# Patient Record
Sex: MALE | Race: WHITE | NOT HISPANIC OR LATINO | URBAN - METROPOLITAN AREA
[De-identification: names, ages, dates, MRNs, and addresses within clinical notes are randomized per-mention and may not be internally consistent; named-entity substitution may affect disease eponyms.]

---

## 2017-04-11 ENCOUNTER — OUTPATIENT (OUTPATIENT)
Dept: OUTPATIENT SERVICES | Facility: HOSPITAL | Age: 67
LOS: 1 days | End: 2017-04-11
Payer: COMMERCIAL

## 2017-04-11 DIAGNOSIS — I77.810 THORACIC AORTIC ECTASIA: ICD-10-CM

## 2017-04-11 DIAGNOSIS — I10 ESSENTIAL (PRIMARY) HYPERTENSION: ICD-10-CM

## 2017-04-11 PROCEDURE — 93306 TTE W/DOPPLER COMPLETE: CPT

## 2017-04-11 PROCEDURE — 93306 TTE W/DOPPLER COMPLETE: CPT | Mod: 26

## 2017-04-20 ENCOUNTER — EMERGENCY (EMERGENCY)
Facility: HOSPITAL | Age: 67
LOS: 1 days | Discharge: HOME | End: 2017-04-20
Attending: EMERGENCY MEDICINE | Admitting: EMERGENCY MEDICINE
Payer: COMMERCIAL

## 2017-04-20 VITALS
SYSTOLIC BLOOD PRESSURE: 179 MMHG | HEART RATE: 91 BPM | OXYGEN SATURATION: 94 % | DIASTOLIC BLOOD PRESSURE: 88 MMHG | RESPIRATION RATE: 18 BRPM | TEMPERATURE: 98 F

## 2017-04-20 VITALS
TEMPERATURE: 98 F | SYSTOLIC BLOOD PRESSURE: 161 MMHG | OXYGEN SATURATION: 95 % | DIASTOLIC BLOOD PRESSURE: 97 MMHG | HEART RATE: 75 BPM | RESPIRATION RATE: 16 BRPM

## 2017-04-20 DIAGNOSIS — R04.0 EPISTAXIS: ICD-10-CM

## 2017-04-20 DIAGNOSIS — Z79.2 LONG TERM (CURRENT) USE OF ANTIBIOTICS: ICD-10-CM

## 2017-04-20 DIAGNOSIS — Z79.899 OTHER LONG TERM (CURRENT) DRUG THERAPY: ICD-10-CM

## 2017-04-20 DIAGNOSIS — I10 ESSENTIAL (PRIMARY) HYPERTENSION: ICD-10-CM

## 2017-04-20 DIAGNOSIS — Z88.1 ALLERGY STATUS TO OTHER ANTIBIOTIC AGENTS STATUS: ICD-10-CM

## 2017-04-20 PROCEDURE — 99282 EMERGENCY DEPT VISIT SF MDM: CPT

## 2017-04-20 RX ORDER — OXYMETAZOLINE HYDROCHLORIDE 0.5 MG/ML
1 SPRAY NASAL ONCE
Qty: 0 | Refills: 0 | Status: COMPLETED | OUTPATIENT
Start: 2017-04-20 | End: 2017-04-20

## 2017-04-20 RX ADMIN — OXYMETAZOLINE HYDROCHLORIDE 1 SPRAY(S): 0.5 SPRAY NASAL at 23:25

## 2017-04-20 NOTE — ED ADULT NURSE NOTE - OBJECTIVE STATEMENT
Pt to the ER c/o spontaneous nose bleed today.  Pt states he has been seen by his PCP for the same and cauterized x 2.  Pt denies injury or trauma.  Pt denies use of blood thinners. Pt denies fevers, sick contacts, or travel out of the country in the last 3 weeks. Upon arrival to the ER, pt is AAO X 4, fully verbal and mobile, gait steady.  Torso and extremities are warm and dry, skin color natural, and turgor normal.  Mucus membranes moist and pink.  HEENT intact. +PERRLA.  Trachea midline. No JVD. Lungs sound  CTA X 4. Resps slow and even, non labored.  S1, S2. No crepitus or signs of trauma. Abdomen soft and round, non distended, and non tender to palpation with normal active BS noted X 4.  Call bell in reach and patient's bed locked and placed in lowest position.  Pt provided with emotional support, comfort, frequent rounding, and POC reviewed.  All verbalized understanding and deny additional needs at this time. Will continue to monitor.

## 2017-04-20 NOTE — ED ADULT TRIAGE NOTE - CHIEF COMPLAINT QUOTE
I have a nosebleed that started today at 5:15 this morning (moderate) and stopped... again at 2 or 3 this afternoon, and again today it started bleeding heavily.  I have seen Dr. Ramos in his office for this before.

## 2017-04-20 NOTE — ED PROVIDER NOTE - OBJECTIVE STATEMENT
epistaxis recurrent last week or so with ENT cautery x several  and to ED as bleeding again today Denies trauma

## 2017-04-20 NOTE — ED ADULT NURSE NOTE - CHPI ED SYMPTOMS NEG
no fever/no syncope/no nausea/no blurred vision/no chills/no change in level of consciousness/no loss of consciousness/no numbness/no weakness/no vomiting

## 2017-09-10 ENCOUNTER — EMERGENCY (EMERGENCY)
Facility: HOSPITAL | Age: 67
LOS: 1 days | Discharge: HOME | End: 2017-09-10
Attending: EMERGENCY MEDICINE | Admitting: EMERGENCY MEDICINE
Payer: COMMERCIAL

## 2017-09-10 VITALS
RESPIRATION RATE: 17 BRPM | WEIGHT: 244.93 LBS | OXYGEN SATURATION: 97 % | HEART RATE: 80 BPM | HEIGHT: 73 IN | DIASTOLIC BLOOD PRESSURE: 81 MMHG | TEMPERATURE: 97 F | SYSTOLIC BLOOD PRESSURE: 145 MMHG

## 2017-09-10 PROCEDURE — 73630 X-RAY EXAM OF FOOT: CPT | Mod: 26

## 2017-09-10 PROCEDURE — 99283 EMERGENCY DEPT VISIT LOW MDM: CPT | Mod: 25

## 2017-09-10 PROCEDURE — 73630 X-RAY EXAM OF FOOT: CPT | Mod: 26,RT

## 2017-09-10 PROCEDURE — 73630 X-RAY EXAM OF FOOT: CPT

## 2017-09-10 RX ORDER — INDOMETHACIN 50 MG
1 CAPSULE ORAL
Qty: 15 | Refills: 0 | OUTPATIENT
Start: 2017-09-10 | End: 2017-09-15

## 2017-09-10 NOTE — ED PROVIDER NOTE - OBJECTIVE STATEMENT
Pt with PMHx HTN, pre-DM p/w R foot pain x 1 week. The pain is located in the location of the 1st MTP. Pt reports he thinks he stepped funny 1 week ago, but did not fall or twist the foot. No blunt trauma. No f/c. + alcohol abuse. No known hx gout. Pt report it was more swollen, but has since improved. Pt able to weight bear.

## 2017-09-10 NOTE — ED PROVIDER NOTE - MEDICAL DECISION MAKING DETAILS
1st MTP pain, swelling, erythema (mild). Gout vs pseudogout. Doubt injury given lack of YANNICK. Xr neg for acute pathology. Presumptive tx of gout. Indomethacin, f/u PCP

## 2017-09-10 NOTE — ED PROVIDER NOTE - DIAGNOSTIC INTERPRETATION
ER Physician: DO MILTON Montano foot INTERPRETATION:  no acute fracture; no soft tissue swelling noted; normal bony alignment.

## 2017-09-11 ENCOUNTER — TRANSCRIPTION ENCOUNTER (OUTPATIENT)
Age: 67
End: 2017-09-11

## 2017-09-14 DIAGNOSIS — M10.9 GOUT, UNSPECIFIED: ICD-10-CM

## 2017-09-14 DIAGNOSIS — Z79.899 OTHER LONG TERM (CURRENT) DRUG THERAPY: ICD-10-CM

## 2017-09-14 DIAGNOSIS — M79.671 PAIN IN RIGHT FOOT: ICD-10-CM

## 2017-09-14 DIAGNOSIS — I10 ESSENTIAL (PRIMARY) HYPERTENSION: ICD-10-CM

## 2017-09-14 DIAGNOSIS — Z88.1 ALLERGY STATUS TO OTHER ANTIBIOTIC AGENTS STATUS: ICD-10-CM

## 2017-11-18 ENCOUNTER — EMERGENCY (EMERGENCY)
Facility: HOSPITAL | Age: 67
LOS: 1 days | Discharge: HOME | End: 2017-11-18
Admitting: EMERGENCY MEDICINE
Payer: COMMERCIAL

## 2017-11-18 VITALS
DIASTOLIC BLOOD PRESSURE: 90 MMHG | OXYGEN SATURATION: 96 % | RESPIRATION RATE: 18 BRPM | SYSTOLIC BLOOD PRESSURE: 148 MMHG | HEIGHT: 73 IN | TEMPERATURE: 99 F | HEART RATE: 96 BPM | WEIGHT: 244.93 LBS

## 2017-11-18 DIAGNOSIS — Z76.0 ENCOUNTER FOR ISSUE OF REPEAT PRESCRIPTION: ICD-10-CM

## 2017-11-18 DIAGNOSIS — Z88.1 ALLERGY STATUS TO OTHER ANTIBIOTIC AGENTS STATUS: ICD-10-CM

## 2017-11-18 DIAGNOSIS — Z79.899 OTHER LONG TERM (CURRENT) DRUG THERAPY: ICD-10-CM

## 2017-11-18 DIAGNOSIS — I10 ESSENTIAL (PRIMARY) HYPERTENSION: ICD-10-CM

## 2017-11-18 PROCEDURE — 99283 EMERGENCY DEPT VISIT LOW MDM: CPT

## 2017-11-18 RX ORDER — ATENOLOL 25 MG/1
1 TABLET ORAL
Qty: 30 | Refills: 0 | OUTPATIENT
Start: 2017-11-18 | End: 2017-12-18

## 2017-11-18 RX ORDER — LOSARTAN POTASSIUM 100 MG/1
1 TABLET, FILM COATED ORAL
Qty: 30 | Refills: 0 | OUTPATIENT
Start: 2017-11-18 | End: 2017-12-18

## 2017-11-18 NOTE — ED ADULT TRIAGE NOTE - CHIEF COMPLAINT QUOTE
Pt requesting Med Refill for BP medications and CO nose bleeds.  No active bleeding in Triage.  No old blood noted to face during triage.  Pt states "I ran out of my blood pressure meds and I had a nosebleed this morning so I think it's related."  PT denies N/V/D, SOB, Fevers, Dizziness

## 2017-11-18 NOTE — ED PROVIDER NOTE - OBJECTIVE STATEMENT
66 yo M with pmh of HTN, GERD, arthritis requesting BP medication refill. Pt states he has intermittent nosebleeds for the past few months. Pt has seen ENT and other specialist but cannot figure out why he is having nosebleeds. Last nosebleed was this morning, lasted for a few min and resolved. Pt states years ago he was cauterized. Pt states he has been trying to conserve his atenolol because he could not get an appointment with his pmd so he has not been taking it every day. Pt ran out yesterday. Pt think when his BP the nosebleeds are more frequent. Denies HA, dizziness, cp, sob.

## 2017-11-18 NOTE — ED PROVIDER NOTE - PHYSICAL EXAMINATION
CONSTITUTIONAL: Well-appearing; well-nourished; in no apparent distress.   HEAD: Normocephalic; atraumatic.   EYES: PERRL; EOM intact; conjunctiva and sclera clear  ENT: normal nose; no rhinorrhea; normal pharynx with no erythema or lesions. +dried blood in both nares  NECK: Supple; non-tender;   CARDIOVASCULAR: Normal S1, S2; no murmurs, rubs, or gallops. Regular rate and rhythm.   RESPIRATORY: Breathing easily; breath sounds clear and equal bilaterally; no wheezes, rhonchi, or rales.  MSK: FROM at all extremities, normal tone   EXT: No cyanosis or edema; N/V intact  SKIN: Normal for age and race; warm; dry; good turgor; no apparent lesions or rash.

## 2017-11-18 NOTE — ED PROVIDER NOTE - NS_EDPROVIDERDISPOUSERTYPE_ED_A_ED
I have personally evaluated and examined the patient. The Attending was available to me as a supervising provider if needed./Attending Attestation (For Attendings USE Only)...

## 2017-11-18 NOTE — ED PROVIDER NOTE - MEDICAL DECISION MAKING DETAILS
66 yo M with pmh of HTN, GERD, arthritis requesting BP medication refill. No active nosebleeding now. No cp, sob, ha, dizziness. Medications refilled. Pt will make an appointment with his primary doctor.

## 2018-01-14 ENCOUNTER — EMERGENCY (EMERGENCY)
Facility: HOSPITAL | Age: 68
LOS: 1 days | Discharge: HOME | End: 2018-01-14
Admitting: EMERGENCY MEDICINE
Payer: MEDICARE

## 2018-01-14 VITALS
DIASTOLIC BLOOD PRESSURE: 88 MMHG | OXYGEN SATURATION: 95 % | HEART RATE: 89 BPM | WEIGHT: 154.98 LBS | TEMPERATURE: 98 F | RESPIRATION RATE: 18 BRPM | SYSTOLIC BLOOD PRESSURE: 149 MMHG

## 2018-01-14 DIAGNOSIS — B02.9 ZOSTER WITHOUT COMPLICATIONS: ICD-10-CM

## 2018-01-14 DIAGNOSIS — Z79.899 OTHER LONG TERM (CURRENT) DRUG THERAPY: ICD-10-CM

## 2018-01-14 DIAGNOSIS — R59.0 LOCALIZED ENLARGED LYMPH NODES: ICD-10-CM

## 2018-01-14 DIAGNOSIS — Z88.0 ALLERGY STATUS TO PENICILLIN: ICD-10-CM

## 2018-01-14 PROCEDURE — 99283 EMERGENCY DEPT VISIT LOW MDM: CPT | Mod: 25

## 2018-01-14 PROCEDURE — 99283 EMERGENCY DEPT VISIT LOW MDM: CPT

## 2018-01-14 RX ORDER — FAMCICLOVIR 500 MG/1
1 TABLET, FILM COATED ORAL
Qty: 21 | Refills: 0 | OUTPATIENT
Start: 2018-01-14 | End: 2018-01-20

## 2018-01-14 NOTE — ED ADULT NURSE NOTE - OBJECTIVE STATEMENT
pt. presented with c/o rash, swelling and discomfort to Lt lower side of face and neck. pt. denies any difficulty breathing or swallowing, fever, chills, n/v/d, pain, weakness.

## 2018-01-14 NOTE — ED PROVIDER NOTE - SKIN, MLM
+ vesicles on an erythematous base to L C3 dermatone - to L scalp - above the ear and another patch behind ear, no pus, no bleeding, no crossing of midline

## 2018-01-14 NOTE — ED PROVIDER NOTE - ENMT, MLM
Airway patent, Nasal mucosa clear. Mouth with normal mucosa. Throat has no vesicles, no oropharyngeal exudates and uvula is midline.  Ears: no vesicles to TMs b/l, TMs pearly gray b/l. + L cervical lymphadenopathy

## 2018-01-14 NOTE — ED ADULT NURSE NOTE - CHPI ED SYMPTOMS NEG
no decreased eating/drinking/no bruising/no chills/no pain/no petechia/no scaly patches on skin/no vomiting/no fever/no itching

## 2018-01-14 NOTE — ED PROVIDER NOTE - OBJECTIVE STATEMENT
The pt is a 66 y/o M, who presents to ED c/o "scratch" to scalp and swollen glands to L neck x 3-4 days. States that thinks his head phones scratched his scalp. + ache to site. has been applying bacitracin w/o relief. Had chicken pox as child. Denies fevers, chills, h/a, pus, bleeding, earache, hearing loss, eye pain, visual changes

## 2018-01-14 NOTE — ED ADULT TRIAGE NOTE - CHIEF COMPLAINT QUOTE
pt states "I feel like the glands of the left side of my neck are swollen" "I feel like I have an abrasion on my head from my ear muffs". reports anxiety "I got a little bit of bad news tonight"

## 2018-01-14 NOTE — ED PROVIDER NOTE - MEDICAL DECISION MAKING DETAILS
pt w/zoster to L C3 dermatone w/some reactive lymphadenopathy, no vesicles to TM nor nose, no eye involvement, no super imposed inf, symptoms present for 3-4 days but will tx w/antivirals regardless, pt understands to f/u w/ent or pmd, will return for any worsening symptoms

## 2018-02-21 ENCOUNTER — EMERGENCY (EMERGENCY)
Facility: HOSPITAL | Age: 68
LOS: 1 days | Discharge: HOME | End: 2018-02-21
Admitting: EMERGENCY MEDICINE
Payer: MEDICARE

## 2018-02-21 VITALS
HEIGHT: 73 IN | HEART RATE: 69 BPM | OXYGEN SATURATION: 96 % | TEMPERATURE: 98 F | WEIGHT: 244.93 LBS | SYSTOLIC BLOOD PRESSURE: 137 MMHG | RESPIRATION RATE: 16 BRPM | DIASTOLIC BLOOD PRESSURE: 84 MMHG
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
## 2018-02-21 DIAGNOSIS — Z79.891 LONG TERM (CURRENT) USE OF OPIATE ANALGESIC: ICD-10-CM

## 2018-02-21 DIAGNOSIS — Z76.0 ENCOUNTER FOR ISSUE OF REPEAT PRESCRIPTION: ICD-10-CM

## 2018-02-21 DIAGNOSIS — Z79.899 OTHER LONG TERM (CURRENT) DRUG THERAPY: ICD-10-CM

## 2018-02-21 DIAGNOSIS — S05.12XA CONTUSION OF EYEBALL AND ORBITAL TISSUES, LEFT EYE, INITIAL ENCOUNTER: ICD-10-CM
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
## 2018-02-21 DIAGNOSIS — Z79.2 LONG TERM (CURRENT) USE OF ANTIBIOTICS: ICD-10-CM
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
## 2018-02-21 DIAGNOSIS — Z88.1 ALLERGY STATUS TO OTHER ANTIBIOTIC AGENTS STATUS: ICD-10-CM
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
## 2018-02-21 DIAGNOSIS — H57.12 OCULAR PAIN, LEFT EYE: ICD-10-CM

## 2018-02-21 PROCEDURE — 99283 EMERGENCY DEPT VISIT LOW MDM: CPT

## 2018-02-21 RX ORDER — ATENOLOL 25 MG/1
1 TABLET ORAL
Qty: 14 | Refills: 0
Start: 2018-02-21 | End: 2018-03-06

## 2018-02-21 RX ORDER — LOSARTAN POTASSIUM 100 MG/1
1 TABLET, FILM COATED ORAL
Qty: 14 | Refills: 0
Start: 2018-02-21 | End: 2018-03-06

## 2018-02-21 NOTE — ED ADULT NURSE NOTE - OBJECTIVE STATEMENT
Pt presents to ED c/o eye injury PTA. Pt reports he was using a stethoscope, ear piece hit him in the L eye, now 3/10 pain. Visual acuity 20/20 L/R/bilateral. Pt also requesting refill of atenolol 25mg. Pt presents in NAD speaking full sentences ambulatory through triage.

## 2018-02-21 NOTE — ED PROVIDER NOTE - EYES, MLM
Clear bilaterally, pupils equal, round and reactive to light. VA: 20/30 b/l and 20/20 OU. normal fundoscopic exam b/l, no tearing b/l, no abrasions, no fb

## 2018-02-21 NOTE — ED PROVIDER NOTE - OBJECTIVE STATEMENT
The pt is a 66 y/o M, who presents to ED stating that he hit himself in the bridge of the nose while attempting to use a stethoscope (that came with a new bp set) -- hit at the bridge of nose / eyeball -not inside the eye with the plastic ear piece, now c/o soreness to area - 4/10, took ibuprofen, here stating "I want to file a complaint because it's the 's fault". Also, requesting refill for his atenolol and losartan (25 mg each). Denies bleeding, swelling, visual loss, h/a, n/v

## 2018-02-21 NOTE — ED PROVIDER NOTE - CARE PLAN
Principal Discharge DX:	Contusion of orbit, initial encounter  Secondary Diagnosis:	Medication refill

## 2018-02-21 NOTE — ED PROVIDER NOTE - MEDICAL DECISION MAKING DETAILS
pt claims to have hit himself w/plastic earpiece of stethoscope while attempting to use it at the inner L bridge of nose / eyeball - no objective findings of trauma, normal eye exam w/va intact, advised to ice and prn ibuprofen, f/u w/optho, pt also wanting refill for his bp meds - will give 2 wks worth until he can see pmd or clinic

## 2018-02-21 NOTE — ED PROVIDER NOTE - ENMT, MLM
Airway patent, Nasal mucosa clear, no nasal bridge swelling or tend. Mouth with normal mucosa. Throat has no vesicles, no oropharyngeal exudates and uvula is midline. no orbital tend b/l, no step offs, no ecchymosis, no breaks in skin

## 2018-03-14 ENCOUNTER — EMERGENCY (EMERGENCY)
Facility: HOSPITAL | Age: 68
LOS: 1 days | Discharge: HOME | End: 2018-03-14
Attending: EMERGENCY MEDICINE | Admitting: EMERGENCY MEDICINE
Payer: MEDICARE

## 2018-03-14 VITALS
SYSTOLIC BLOOD PRESSURE: 161 MMHG | RESPIRATION RATE: 18 BRPM | TEMPERATURE: 97 F | DIASTOLIC BLOOD PRESSURE: 97 MMHG | HEART RATE: 97 BPM | OXYGEN SATURATION: 97 % | WEIGHT: 250 LBS | HEIGHT: 73 IN

## 2018-03-14 DIAGNOSIS — Z88.0 ALLERGY STATUS TO PENICILLIN: ICD-10-CM

## 2018-03-14 DIAGNOSIS — Z79.899 OTHER LONG TERM (CURRENT) DRUG THERAPY: ICD-10-CM

## 2018-03-14 DIAGNOSIS — L03.115 CELLULITIS OF RIGHT LOWER LIMB: ICD-10-CM

## 2018-03-14 DIAGNOSIS — M79.661 PAIN IN RIGHT LOWER LEG: ICD-10-CM

## 2018-03-14 DIAGNOSIS — I10 ESSENTIAL (PRIMARY) HYPERTENSION: ICD-10-CM

## 2018-03-14 PROCEDURE — 99284 EMERGENCY DEPT VISIT MOD MDM: CPT | Mod: 25

## 2018-03-15 VITALS
SYSTOLIC BLOOD PRESSURE: 166 MMHG | RESPIRATION RATE: 18 BRPM | OXYGEN SATURATION: 97 % | DIASTOLIC BLOOD PRESSURE: 92 MMHG | HEART RATE: 76 BPM | TEMPERATURE: 97 F

## 2018-03-15 LAB
ALBUMIN SERPL ELPH-MCNC: 3.9 G/DL — SIGNIFICANT CHANGE UP (ref 3.3–5)
ALP SERPL-CCNC: 53 U/L — SIGNIFICANT CHANGE UP (ref 40–120)
ALT FLD-CCNC: 18 U/L — SIGNIFICANT CHANGE UP (ref 10–45)
ANION GAP SERPL CALC-SCNC: 12 MMOL/L — SIGNIFICANT CHANGE UP (ref 5–17)
APTT BLD: 32 SEC — SIGNIFICANT CHANGE UP (ref 27.5–37.4)
AST SERPL-CCNC: 15 U/L — SIGNIFICANT CHANGE UP (ref 10–40)
BASOPHILS NFR BLD AUTO: 0.7 % — SIGNIFICANT CHANGE UP (ref 0–2)
BILIRUB SERPL-MCNC: 0.3 MG/DL — SIGNIFICANT CHANGE UP (ref 0.2–1.2)
BUN SERPL-MCNC: 15 MG/DL — SIGNIFICANT CHANGE UP (ref 7–23)
CALCIUM SERPL-MCNC: 9.2 MG/DL — SIGNIFICANT CHANGE UP (ref 8.4–10.5)
CHLORIDE SERPL-SCNC: 102 MMOL/L — SIGNIFICANT CHANGE UP (ref 96–108)
CO2 SERPL-SCNC: 22 MMOL/L — SIGNIFICANT CHANGE UP (ref 22–31)
CREAT SERPL-MCNC: 0.88 MG/DL — SIGNIFICANT CHANGE UP (ref 0.5–1.3)
EOSINOPHIL NFR BLD AUTO: 3.4 % — SIGNIFICANT CHANGE UP (ref 0–6)
GLUCOSE SERPL-MCNC: 125 MG/DL — HIGH (ref 70–99)
HCT VFR BLD CALC: 43 % — SIGNIFICANT CHANGE UP (ref 39–50)
HGB BLD-MCNC: 14.9 G/DL — SIGNIFICANT CHANGE UP (ref 13–17)
INR BLD: 0.98 — SIGNIFICANT CHANGE UP (ref 0.88–1.16)
LYMPHOCYTES # BLD AUTO: 16.2 % — SIGNIFICANT CHANGE UP (ref 13–44)
MCHC RBC-ENTMCNC: 30.7 PG — SIGNIFICANT CHANGE UP (ref 27–34)
MCHC RBC-ENTMCNC: 34.7 G/DL — SIGNIFICANT CHANGE UP (ref 32–36)
MCV RBC AUTO: 88.5 FL — SIGNIFICANT CHANGE UP (ref 80–100)
MONOCYTES NFR BLD AUTO: 10 % — SIGNIFICANT CHANGE UP (ref 2–14)
NEUTROPHILS NFR BLD AUTO: 69.7 % — SIGNIFICANT CHANGE UP (ref 43–77)
PLATELET # BLD AUTO: 225 K/UL — SIGNIFICANT CHANGE UP (ref 150–400)
POTASSIUM SERPL-MCNC: 4.5 MMOL/L — SIGNIFICANT CHANGE UP (ref 3.5–5.3)
POTASSIUM SERPL-SCNC: 4.5 MMOL/L — SIGNIFICANT CHANGE UP (ref 3.5–5.3)
PROT SERPL-MCNC: 7.5 G/DL — SIGNIFICANT CHANGE UP (ref 6–8.3)
PROTHROM AB SERPL-ACNC: 10.9 SEC — SIGNIFICANT CHANGE UP (ref 9.8–12.7)
RBC # BLD: 4.86 M/UL — SIGNIFICANT CHANGE UP (ref 4.2–5.8)
RBC # FLD: 14 % — SIGNIFICANT CHANGE UP (ref 10.3–16.9)
SODIUM SERPL-SCNC: 136 MMOL/L — SIGNIFICANT CHANGE UP (ref 135–145)
TSH SERPL-MCNC: 1.92 UIU/ML — SIGNIFICANT CHANGE UP (ref 0.35–4.94)
URATE SERPL-MCNC: 6.8 — SIGNIFICANT CHANGE UP
WBC # BLD: 9.9 K/UL — SIGNIFICANT CHANGE UP (ref 3.8–10.5)
WBC # FLD AUTO: 9.9 K/UL — SIGNIFICANT CHANGE UP (ref 3.8–10.5)

## 2018-03-15 PROCEDURE — 99284 EMERGENCY DEPT VISIT MOD MDM: CPT | Mod: 25

## 2018-03-15 PROCEDURE — 84550 ASSAY OF BLOOD/URIC ACID: CPT

## 2018-03-15 PROCEDURE — 83735 ASSAY OF MAGNESIUM: CPT

## 2018-03-15 PROCEDURE — 84443 ASSAY THYROID STIM HORMONE: CPT

## 2018-03-15 PROCEDURE — 93971 EXTREMITY STUDY: CPT

## 2018-03-15 PROCEDURE — 93971 EXTREMITY STUDY: CPT | Mod: 26,RT

## 2018-03-15 PROCEDURE — 85025 COMPLETE CBC W/AUTO DIFF WBC: CPT

## 2018-03-15 PROCEDURE — 80053 COMPREHEN METABOLIC PANEL: CPT

## 2018-03-15 PROCEDURE — 84100 ASSAY OF PHOSPHORUS: CPT

## 2018-03-15 PROCEDURE — 85730 THROMBOPLASTIN TIME PARTIAL: CPT

## 2018-03-15 PROCEDURE — 36415 COLL VENOUS BLD VENIPUNCTURE: CPT

## 2018-03-15 PROCEDURE — 85610 PROTHROMBIN TIME: CPT

## 2018-03-15 RX ORDER — CEPHALEXIN 500 MG
1 CAPSULE ORAL
Qty: 21 | Refills: 0
Start: 2018-03-15 | End: 2018-03-21

## 2018-03-15 RX ORDER — CEPHALEXIN 500 MG
500 CAPSULE ORAL ONCE
Qty: 0 | Refills: 0 | Status: COMPLETED | OUTPATIENT
Start: 2018-03-15 | End: 2018-03-15

## 2018-03-15 RX ADMIN — Medication 500 MILLIGRAM(S): at 04:23

## 2018-03-15 NOTE — ED PROVIDER NOTE - PHYSICAL EXAMINATION
CON: ao x 3, HENMT: clear oropharynx, soft neck, HEAD: atraumatic, SKIN: erythema over R great toe, no petechiae, no gangrene, no crepitus, no fluctuance or induration, MSK: no deformities, noted swelling to R great toe, soft compartment, cap refill wnl, distal pulses intact, sensation intact, NEURO: no gross motor or sensory deficit

## 2018-03-15 NOTE — ED ADULT NURSE REASSESSMENT NOTE - NS ED NURSE REASSESS COMMENT FT1
Pt A&Ox3 at this time resting comfortably in bed at this time pending US at 230am as per MD Blandon.  Pt stable at this time and will continue to monitor.

## 2018-03-15 NOTE — ED ADULT NURSE NOTE - DISCHARGE TEACHING
Pt instructed to follow up with PCP and to return to ed if sx worsen or new sx arise. Pt stable for discharge and noted to be oob with steady gait.

## 2018-03-15 NOTE — ED PROVIDER NOTE - MEDICAL DECISION MAKING DETAILS
hx of gout, toe pain w/ erythema, atraumatic, nontoxic, pulses intact, rle cramp, will check labs, xray, US

## 2018-03-15 NOTE — ED ADULT NURSE NOTE - OBJECTIVE STATEMENT
66 yo M c.o right lower leg swelling and pain x 5 days. Pt states pain is 2:10 in right calf and when walking pain radiates to right foot and increases to a 5:10. Pt denies cp, sob, numbness or tingling in bl upper and lower extremities at this time. Pt has +dorsal pulses noted. Pt is A&Ox3 with no s.s of acute distress. Pt stable and will continue to monitor.

## 2018-03-15 NOTE — ED PROVIDER NOTE - OBJECTIVE STATEMENT
67 yom pw RLE calf pain and swelling since Sat, also states R toe was red and painful, no trauma.  toe pain preceding calf pain.  no pain behind knee.  no hx of DVT.  no exogenous hormone use.  no cp.  no sob.  +intermittent chronic leg cramp.

## 2018-03-23 ENCOUNTER — APPOINTMENT (OUTPATIENT)
Dept: HEART AND VASCULAR | Facility: CLINIC | Age: 68
End: 2018-03-23
Payer: MEDICARE

## 2018-03-23 VITALS
HEART RATE: 66 BPM | SYSTOLIC BLOOD PRESSURE: 128 MMHG | WEIGHT: 250 LBS | TEMPERATURE: 97.6 F | DIASTOLIC BLOOD PRESSURE: 90 MMHG | RESPIRATION RATE: 14 BRPM | HEIGHT: 73 IN | OXYGEN SATURATION: 96 % | BODY MASS INDEX: 33.13 KG/M2

## 2018-03-23 DIAGNOSIS — M17.11 UNILATERAL PRIMARY OSTEOARTHRITIS, RIGHT KNEE: ICD-10-CM

## 2018-03-23 DIAGNOSIS — R94.31 ABNORMAL ELECTROCARDIOGRAM [ECG] [EKG]: ICD-10-CM

## 2018-03-23 PROCEDURE — 93000 ELECTROCARDIOGRAM COMPLETE: CPT

## 2018-03-23 PROCEDURE — 93306 TTE W/DOPPLER COMPLETE: CPT | Mod: XE

## 2018-03-23 PROCEDURE — 99205 OFFICE O/P NEW HI 60 MIN: CPT | Mod: 25

## 2018-04-05 ENCOUNTER — APPOINTMENT (OUTPATIENT)
Dept: RHEUMATOLOGY | Facility: CLINIC | Age: 68
End: 2018-04-05

## 2018-04-26 ENCOUNTER — APPOINTMENT (OUTPATIENT)
Dept: HEART AND VASCULAR | Facility: CLINIC | Age: 68
End: 2018-04-26
Payer: MEDICARE

## 2018-04-26 PROCEDURE — 93320 DOPPLER ECHO COMPLETE: CPT

## 2018-04-26 PROCEDURE — 93351 STRESS TTE COMPLETE: CPT

## 2018-04-26 PROCEDURE — 93325 DOPPLER ECHO COLOR FLOW MAPG: CPT

## 2018-05-24 ENCOUNTER — LABORATORY RESULT (OUTPATIENT)
Age: 68
End: 2018-05-24

## 2018-05-24 ENCOUNTER — APPOINTMENT (OUTPATIENT)
Dept: HEART AND VASCULAR | Facility: CLINIC | Age: 68
End: 2018-05-24
Payer: MEDICARE

## 2018-05-24 VITALS
WEIGHT: 247 LBS | HEIGHT: 73 IN | HEART RATE: 66 BPM | RESPIRATION RATE: 14 BRPM | DIASTOLIC BLOOD PRESSURE: 78 MMHG | TEMPERATURE: 97.7 F | BODY MASS INDEX: 32.74 KG/M2 | OXYGEN SATURATION: 97 % | SYSTOLIC BLOOD PRESSURE: 130 MMHG

## 2018-05-24 PROCEDURE — 99214 OFFICE O/P EST MOD 30 MIN: CPT | Mod: 25

## 2018-05-24 PROCEDURE — 36415 COLL VENOUS BLD VENIPUNCTURE: CPT

## 2018-05-27 LAB
25(OH)D3 SERPL-MCNC: 29.2 NG/ML
ALBUMIN SERPL ELPH-MCNC: 4.2 G/DL
ALP BLD-CCNC: 64 U/L
ALT SERPL-CCNC: 27 U/L
ANION GAP SERPL CALC-SCNC: 17 MMOL/L
APPEARANCE: CLEAR
AST SERPL-CCNC: 16 U/L
BASOPHILS # BLD AUTO: 0.06 K/UL
BASOPHILS NFR BLD AUTO: 0.7 %
BILIRUB SERPL-MCNC: 0.7 MG/DL
BILIRUBIN URINE: NEGATIVE
BLOOD URINE: NEGATIVE
BUN SERPL-MCNC: 17 MG/DL
CALCIUM SERPL-MCNC: 9.7 MG/DL
CHLORIDE SERPL-SCNC: 104 MMOL/L
CO2 SERPL-SCNC: 19 MMOL/L
COLOR: ABNORMAL
CREAT SERPL-MCNC: 0.9 MG/DL
EOSINOPHIL # BLD AUTO: 0.35 K/UL
EOSINOPHIL NFR BLD AUTO: 4.4 %
GLUCOSE QUALITATIVE U: NEGATIVE MG/DL
GLUCOSE SERPL-MCNC: 99 MG/DL
HBA1C MFR BLD HPLC: 5.9 %
HCT VFR BLD CALC: 47.5 %
HGB BLD-MCNC: 15 G/DL
IMM GRANULOCYTES NFR BLD AUTO: 0.2 %
KETONES URINE: NEGATIVE
LEUKOCYTE ESTERASE URINE: NEGATIVE
LYMPHOCYTES # BLD AUTO: 1.76 K/UL
LYMPHOCYTES NFR BLD AUTO: 21.9 %
MAN DIFF?: NORMAL
MCHC RBC-ENTMCNC: 29.8 PG
MCHC RBC-ENTMCNC: 31.6 GM/DL
MCV RBC AUTO: 94.2 FL
MONOCYTES # BLD AUTO: 0.68 K/UL
MONOCYTES NFR BLD AUTO: 8.5 %
NEUTROPHILS # BLD AUTO: 5.17 K/UL
NEUTROPHILS NFR BLD AUTO: 64.3 %
NITRITE URINE: NEGATIVE
NT-PROBNP SERPL-MCNC: 34 PG/ML
PH URINE: 5.5
PLATELET # BLD AUTO: 190 K/UL
POTASSIUM SERPL-SCNC: 5 MMOL/L
PROT SERPL-MCNC: 7.2 G/DL
PROTEIN URINE: NEGATIVE MG/DL
PSA FREE FLD-MCNC: 22.6
PSA FREE SERPL-MCNC: 0.14 NG/ML
PSA SERPL-MCNC: 0.62 NG/ML
RBC # BLD: 5.04 M/UL
RBC # FLD: 14.9 %
SODIUM SERPL-SCNC: 140 MMOL/L
SPECIFIC GRAVITY URINE: 1.02
TSH SERPL-ACNC: 1.74 UIU/ML
UROBILINOGEN URINE: NEGATIVE MG/DL
WBC # FLD AUTO: 8.04 K/UL

## 2018-05-29 LAB
TESTOST BND SERPL-MCNC: 5.9 PG/ML
TESTOST SERPL-MCNC: 222.7 NG/DL

## 2018-06-07 ENCOUNTER — APPOINTMENT (OUTPATIENT)
Dept: RHEUMATOLOGY | Facility: CLINIC | Age: 68
End: 2018-06-07
Payer: MEDICARE

## 2018-06-07 VITALS
WEIGHT: 248 LBS | BODY MASS INDEX: 32.87 KG/M2 | HEART RATE: 69 BPM | DIASTOLIC BLOOD PRESSURE: 78 MMHG | SYSTOLIC BLOOD PRESSURE: 135 MMHG | HEIGHT: 73 IN | OXYGEN SATURATION: 96 % | TEMPERATURE: 98.1 F

## 2018-06-07 PROCEDURE — 99205 OFFICE O/P NEW HI 60 MIN: CPT

## 2018-06-12 LAB
ALBUMIN SERPL ELPH-MCNC: 4 G/DL
ALP BLD-CCNC: 60 U/L
ALT SERPL-CCNC: 18 U/L
ANION GAP SERPL CALC-SCNC: 14 MMOL/L
AST SERPL-CCNC: 15 U/L
BASOPHILS # BLD AUTO: 0.04 K/UL
BASOPHILS NFR BLD AUTO: 0.5 %
BILIRUB SERPL-MCNC: 0.4 MG/DL
BUN SERPL-MCNC: 19 MG/DL
CALCIUM SERPL-MCNC: 9 MG/DL
CHLORIDE SERPL-SCNC: 107 MMOL/L
CO2 SERPL-SCNC: 21 MMOL/L
CREAT SERPL-MCNC: 1.06 MG/DL
CRP SERPL-MCNC: 0.4 MG/DL
EOSINOPHIL # BLD AUTO: 0.38 K/UL
EOSINOPHIL NFR BLD AUTO: 4.8 %
ERYTHROCYTE [SEDIMENTATION RATE] IN BLOOD BY WESTERGREN METHOD: 7 MM/HR
GLUCOSE SERPL-MCNC: 111 MG/DL
HCT VFR BLD CALC: 44.5 %
HGB BLD-MCNC: 14.5 G/DL
IMM GRANULOCYTES NFR BLD AUTO: 0.4 %
LYMPHOCYTES # BLD AUTO: 2.15 K/UL
LYMPHOCYTES NFR BLD AUTO: 27.2 %
MAN DIFF?: NORMAL
MCHC RBC-ENTMCNC: 30.3 PG
MCHC RBC-ENTMCNC: 32.6 GM/DL
MCV RBC AUTO: 92.9 FL
MONOCYTES # BLD AUTO: 0.76 K/UL
MONOCYTES NFR BLD AUTO: 9.6 %
NEUTROPHILS # BLD AUTO: 4.53 K/UL
NEUTROPHILS NFR BLD AUTO: 57.5 %
PLATELET # BLD AUTO: 253 K/UL
POTASSIUM SERPL-SCNC: 4.4 MMOL/L
PROT SERPL-MCNC: 6.9 G/DL
RBC # BLD: 4.79 M/UL
RBC # FLD: 14.4 %
SODIUM SERPL-SCNC: 142 MMOL/L
URATE SERPL-MCNC: 8.6 MG/DL
WBC # FLD AUTO: 7.89 K/UL

## 2018-07-02 ENCOUNTER — RX RENEWAL (OUTPATIENT)
Age: 68
End: 2018-07-02

## 2018-07-02 ENCOUNTER — APPOINTMENT (OUTPATIENT)
Dept: RHEUMATOLOGY | Facility: CLINIC | Age: 68
End: 2018-07-02
Payer: MEDICARE

## 2018-07-02 VITALS
OXYGEN SATURATION: 96 % | WEIGHT: 246 LBS | DIASTOLIC BLOOD PRESSURE: 83 MMHG | HEART RATE: 76 BPM | HEIGHT: 73 IN | SYSTOLIC BLOOD PRESSURE: 125 MMHG | TEMPERATURE: 98.2 F | BODY MASS INDEX: 32.6 KG/M2

## 2018-07-02 PROCEDURE — 99214 OFFICE O/P EST MOD 30 MIN: CPT

## 2018-07-03 LAB
ALBUMIN SERPL ELPH-MCNC: 4.1 G/DL
ALP BLD-CCNC: 58 U/L
ALT SERPL-CCNC: 21 U/L
ANION GAP SERPL CALC-SCNC: 12 MMOL/L
AST SERPL-CCNC: 20 U/L
BASOPHILS # BLD AUTO: 0.06 K/UL
BASOPHILS NFR BLD AUTO: 0.9 %
BILIRUB SERPL-MCNC: 0.3 MG/DL
BUN SERPL-MCNC: 21 MG/DL
CALCIUM SERPL-MCNC: 9.3 MG/DL
CHLORIDE SERPL-SCNC: 107 MMOL/L
CO2 SERPL-SCNC: 20 MMOL/L
CREAT SERPL-MCNC: 1.16 MG/DL
CRP SERPL-MCNC: 0.2 MG/DL
EOSINOPHIL # BLD AUTO: 0.28 K/UL
EOSINOPHIL NFR BLD AUTO: 4.1 %
ERYTHROCYTE [SEDIMENTATION RATE] IN BLOOD BY WESTERGREN METHOD: 4 MM/HR
GLUCOSE SERPL-MCNC: 145 MG/DL
HCT VFR BLD CALC: 45.2 %
HGB BLD-MCNC: 14.7 G/DL
IMM GRANULOCYTES NFR BLD AUTO: 0.4 %
LYMPHOCYTES # BLD AUTO: 1.81 K/UL
LYMPHOCYTES NFR BLD AUTO: 26.4 %
MAN DIFF?: NORMAL
MCHC RBC-ENTMCNC: 30.7 PG
MCHC RBC-ENTMCNC: 32.5 GM/DL
MCV RBC AUTO: 94.4 FL
MONOCYTES # BLD AUTO: 0.33 K/UL
MONOCYTES NFR BLD AUTO: 4.8 %
NEUTROPHILS # BLD AUTO: 4.35 K/UL
NEUTROPHILS NFR BLD AUTO: 63.4 %
PLATELET # BLD AUTO: 230 K/UL
POTASSIUM SERPL-SCNC: 4.6 MMOL/L
PROT SERPL-MCNC: 6.9 G/DL
RBC # BLD: 4.79 M/UL
RBC # FLD: 14.4 %
SODIUM SERPL-SCNC: 139 MMOL/L
URATE SERPL-MCNC: 8.1 MG/DL
WBC # FLD AUTO: 6.86 K/UL

## 2018-07-24 PROBLEM — R94.31 ABNORMAL ELECTROCARDIOGRAM: Status: ACTIVE | Noted: 2018-03-24

## 2018-12-17 ENCOUNTER — RX RENEWAL (OUTPATIENT)
Age: 68
End: 2018-12-17

## 2019-02-13 ENCOUNTER — APPOINTMENT (OUTPATIENT)
Dept: HEART AND VASCULAR | Facility: CLINIC | Age: 69
End: 2019-02-13
Payer: MEDICARE

## 2019-02-13 VITALS
TEMPERATURE: 97.2 F | HEIGHT: 73 IN | OXYGEN SATURATION: 95 % | RESPIRATION RATE: 14 BRPM | BODY MASS INDEX: 33.66 KG/M2 | HEART RATE: 91 BPM | WEIGHT: 254 LBS | DIASTOLIC BLOOD PRESSURE: 70 MMHG | SYSTOLIC BLOOD PRESSURE: 120 MMHG

## 2019-02-13 DIAGNOSIS — H26.9 UNSPECIFIED CATARACT: ICD-10-CM

## 2019-02-13 DIAGNOSIS — M17.12 UNILATERAL PRIMARY OSTEOARTHRITIS, LEFT KNEE: ICD-10-CM

## 2019-02-13 DIAGNOSIS — M16.12 UNILATERAL PRIMARY OSTEOARTHRITIS, LEFT HIP: ICD-10-CM

## 2019-02-13 DIAGNOSIS — I10 ESSENTIAL (PRIMARY) HYPERTENSION: ICD-10-CM

## 2019-02-13 DIAGNOSIS — I34.0 NONRHEUMATIC MITRAL (VALVE) INSUFFICIENCY: ICD-10-CM

## 2019-02-13 PROBLEM — E11.9 TYPE 2 DIABETES MELLITUS WITHOUT COMPLICATIONS: Chronic | Status: ACTIVE | Noted: 2018-03-15

## 2019-02-13 PROCEDURE — 36415 COLL VENOUS BLD VENIPUNCTURE: CPT

## 2019-02-13 PROCEDURE — 93000 ELECTROCARDIOGRAM COMPLETE: CPT

## 2019-02-13 PROCEDURE — 99214 OFFICE O/P EST MOD 30 MIN: CPT

## 2019-02-13 RX ORDER — TESTOSTERONE 16.2 MG/G
20.25 MG/1.25GM GEL TRANSDERMAL
Qty: 1 | Refills: 0 | Status: DISCONTINUED | COMMUNITY
Start: 2018-03-23 | End: 2019-02-13

## 2019-02-13 NOTE — REVIEW OF SYSTEMS
[Feeling Fatigued] : feeling fatigued [Shortness Of Breath] : no shortness of breath [Dyspnea on exertion] : dyspnea during exertion [Chest  Pressure] : no chest pressure [Chest Pain] : no chest pain [Lower Ext Edema] : lower extremity edema [Leg Claudication] : no intermittent leg claudication [Palpitations] : no palpitations [Joint Pain] : joint pain [Joint Swelling] : no joint swelling [Joint Stiffness] : joint stiffness [Muscle Cramps] : no muscle cramps [Limb Weakness (Paresis)] : no limb weakness [Change In Color Of Skin] : change in skin color [Negative] : Heme/Lymph [FreeTextEntry1] : night sweats

## 2019-02-13 NOTE — DISCUSSION/SUMMARY
[Essential Hypertension] : essential hypertension [Stable] : stable [Responding to Treatment] : responding to treatment [None] : none [___ Month(s)] : [unfilled] month(s) [With Me] : with me [FreeTextEntry1] : venipuncture performed with STD screen , HgbA1c\par \par \par Valacyclovir 1 gm bid for 5 days ,  acyclovir cream \par \par Advised to resume metformin \par \par Refuses flu vaccine

## 2019-02-13 NOTE — PHYSICAL EXAM
[General Appearance - Well Developed] : well developed [Normal Appearance] : normal appearance [Well Groomed] : well groomed [General Appearance - Well Nourished] : well nourished [No Deformities] : no deformities [General Appearance - In No Acute Distress] : no acute distress [Normal Conjunctiva] : the conjunctiva exhibited no abnormalities [Eyelids - No Xanthelasma] : the eyelids demonstrated no xanthelasmas [Normal Oral Mucosa] : normal oral mucosa [No Oral Pallor] : no oral pallor [No Oral Cyanosis] : no oral cyanosis [Normal Jugular Venous A Waves Present] : normal jugular venous A waves present [Normal Jugular Venous V Waves Present] : normal jugular venous V waves present [No Jugular Venous Baxter A Waves] : no jugular venous baxter A waves [Respiration, Rhythm And Depth] : normal respiratory rhythm and effort [Exaggerated Use Of Accessory Muscles For Inspiration] : no accessory muscle use [Auscultation Breath Sounds / Voice Sounds] : lungs were clear to auscultation bilaterally [Heart Rate And Rhythm] : heart rate and rhythm were normal [Heart Sounds] : normal S1 and S2 [Murmurs] : no murmurs present [Arterial Pulses Normal] : the arterial pulses were normal [FreeTextEntry1] : 1-2+ pitting edema  [Bowel Sounds] : normal bowel sounds [Abdomen Soft] : soft [Abdomen Tenderness] : non-tender [Abdomen Mass (___ Cm)] : no abdominal mass palpated [Abnormal Walk] : normal gait [Gait - Sufficient For Exercise Testing] : the gait was sufficient for exercise testing [Nail Clubbing] : no clubbing of the fingernails [Cyanosis, Localized] : no localized cyanosis [Petechial Hemorrhages (___cm)] : no petechial hemorrhages [Nail Splinter Hemorrhages] : no splinter hemorrhages of the nails [Fingers Osler's Nodes] : Osler's nodes were not seenon the fingers [Skin Color & Pigmentation] : normal skin color and pigmentation [Skin Turgor] : normal skin turgor [] : no rash [No Venous Stasis] : no venous stasis [Skin Lesions] : no skin lesions [No Skin Ulcers] : no skin ulcer [No Xanthoma] : no  xanthoma was observed [Oriented To Time, Place, And Person] : oriented to person, place, and time [Impaired Insight] : insight and judgment were intact [Affect] : the affect was normal [Mood] : the mood was normal [Memory Recent] : recent memory was not impaired [Memory Remote] : remote memory was not impaired [No Anxiety] : not feeling anxious

## 2019-02-13 NOTE — REASON FOR VISIT
[Follow-Up - Clinic] : a clinic follow-up of [Abnormal ECG] : an abnormal ECG [Hyperlipidemia] : hyperlipidemia [Hypertension] : hypertension [Mitral Regurgitation] : mitral regurgitation [FreeTextEntry1] : 68 year old male with HTN and mild type 2 diabetes presents for follow up and blood work   after recent bad cold  which triggered recurrent genital herpes. \par  \par  Has not had stress testing in a few years . Denies hx of MI, CHF, stroke.   He is an active, light smoker  and prior alcohol abuser.  Recent negative venous duplex scan  for clot. no past hx of thrombophilia. \par \par Denies hematuria, rectal bleeding, syncope, claudication .\par \par Has mild dyspnea on exertion , daytime fatigue

## 2019-02-13 NOTE — HISTORY OF PRESENT ILLNESS
[FreeTextEntry1] : EKG shows NSR 85 bpm without ectopy, ischemia or LVH\par \par No fevers,  but has night sweats \par \par No hematuria or rectal bleeding\par \par Refuses flu vaccine \par \par Variably compliant with medications \par \par \par Noticed ~ 10 lb weight gain after stopping metformin

## 2019-02-26 LAB
ALBUMIN SERPL ELPH-MCNC: 3.9 G/DL
ALP BLD-CCNC: 61 U/L
ALT SERPL-CCNC: 25 U/L
ANION GAP SERPL CALC-SCNC: 12 MMOL/L
APPEARANCE: CLEAR
AST SERPL-CCNC: 18 U/L
BASOPHILS # BLD AUTO: 0.04 K/UL
BASOPHILS NFR BLD AUTO: 0.6 %
BILIRUB SERPL-MCNC: 0.4 MG/DL
BILIRUBIN URINE: NEGATIVE
BLOOD URINE: NEGATIVE
BUN SERPL-MCNC: 17 MG/DL
C TRACH RRNA SPEC QL NAA+PROBE: NOT DETECTED
CALCIUM SERPL-MCNC: 9 MG/DL
CHLORIDE SERPL-SCNC: 107 MMOL/L
CHOLEST SERPL-MCNC: 171 MG/DL
CHOLEST/HDLC SERPL: 6.8 RATIO
CO2 SERPL-SCNC: 23 MMOL/L
COLOR: YELLOW
CREAT SERPL-MCNC: 0.77 MG/DL
CREAT SPEC-SCNC: 113 MG/DL
EOSINOPHIL # BLD AUTO: 0.33 K/UL
EOSINOPHIL NFR BLD AUTO: 4.6 %
GLUCOSE QUALITATIVE U: NEGATIVE MG/DL
GLUCOSE SERPL-MCNC: 131 MG/DL
HBA1C MFR BLD HPLC: 6.6 %
HCT VFR BLD CALC: 45 %
HDLC SERPL-MCNC: 25 MG/DL
HGB BLD-MCNC: 14.6 G/DL
HIV1+2 AB SPEC QL IA.RAPID: NONREACTIVE
HSV 1+2 IGG SER IA-IMP: POSITIVE
HSV 1+2 IGG SER IA-IMP: POSITIVE
HSV1 IGG SER QL: 31.3 INDEX
HSV2 IGG SER QL: 1.96 INDEX
IMM GRANULOCYTES NFR BLD AUTO: 0.3 %
KETONES URINE: NEGATIVE
LDLC SERPL CALC-MCNC: NORMAL
LEUKOCYTE ESTERASE URINE: NEGATIVE
LYMPHOCYTES # BLD AUTO: 1.82 K/UL
LYMPHOCYTES NFR BLD AUTO: 25.5 %
MAN DIFF?: NORMAL
MCHC RBC-ENTMCNC: 30 PG
MCHC RBC-ENTMCNC: 32.4 GM/DL
MCV RBC AUTO: 92.6 FL
MICROALBUMIN 24H UR DL<=1MG/L-MCNC: <1.2 MG/DL
MICROALBUMIN/CREAT 24H UR-RTO: NORMAL
MONOCYTES # BLD AUTO: 0.44 K/UL
MONOCYTES NFR BLD AUTO: 6.2 %
N GONORRHOEA RRNA SPEC QL NAA+PROBE: NOT DETECTED
NEUTROPHILS # BLD AUTO: 4.49 K/UL
NEUTROPHILS NFR BLD AUTO: 62.8 %
NITRITE URINE: NEGATIVE
PH URINE: 5
PLATELET # BLD AUTO: 240 K/UL
POTASSIUM SERPL-SCNC: 4.7 MMOL/L
PROT SERPL-MCNC: 6.9 G/DL
PROTEIN URINE: NEGATIVE MG/DL
RBC # BLD: 4.86 M/UL
RBC # FLD: 14.2 %
SODIUM SERPL-SCNC: 142 MMOL/L
SOURCE AMPLIFICATION: NORMAL
SPECIFIC GRAVITY URINE: 1.02
T PALLIDUM AB SER QL IA: NEGATIVE
TRIGL SERPL-MCNC: 593 MG/DL
TSH SERPL-ACNC: 1.71 UIU/ML
UROBILINOGEN URINE: NEGATIVE MG/DL
WBC # FLD AUTO: 7.14 K/UL

## 2019-07-27 ENCOUNTER — RX RENEWAL (OUTPATIENT)
Age: 69
End: 2019-07-27

## 2019-07-31 ENCOUNTER — EMERGENCY (EMERGENCY)
Facility: HOSPITAL | Age: 69
LOS: 1 days | Discharge: HOME | End: 2019-07-31
Admitting: EMERGENCY MEDICINE
Payer: MEDICARE

## 2019-07-31 VITALS
TEMPERATURE: 98 F | DIASTOLIC BLOOD PRESSURE: 98 MMHG | RESPIRATION RATE: 18 BRPM | SYSTOLIC BLOOD PRESSURE: 151 MMHG | WEIGHT: 244.93 LBS | OXYGEN SATURATION: 95 % | HEART RATE: 81 BPM

## 2019-07-31 VITALS
DIASTOLIC BLOOD PRESSURE: 77 MMHG | HEART RATE: 82 BPM | OXYGEN SATURATION: 98 % | SYSTOLIC BLOOD PRESSURE: 136 MMHG | RESPIRATION RATE: 18 BRPM | TEMPERATURE: 98 F

## 2019-07-31 PROCEDURE — 99282 EMERGENCY DEPT VISIT SF MDM: CPT

## 2019-07-31 NOTE — ED PROVIDER NOTE - CLINICAL SUMMARY MEDICAL DECISION MAKING FREE TEXT BOX
This is a pleasant 69 year old male presenting to the ed for a wound check,. states that it occurred this past Saturday . This is a pleasant 69 year old male presenting to the ed for a wound check,. states that it occurred this past Saturday . tetanus is up to date. physical exam, patient appears well non-toxic. 8cm linear horizontal abrasion to the medial distal leg. no surrounding cellulitis or erythema. no ttp. patient is encoruaged to place bacitracin bid and follow wound care instructions. ED evaluation and management discussed with the patient and family (if available) in detail.  Close PMD follow up encouraged.  Strict ED return instructions discussed in detail and patient given the opportunity to ask any questions about their discharge diagnosis and instructions. Patient verbalized understanding. Patient is agreeable to plan.

## 2019-07-31 NOTE — ED PROVIDER NOTE - OBJECTIVE STATEMENT
wound is on the left ankle. [place]. states that it cut his ankle. did not notice until Sunday. did not have bleeding. states that he started to wash with soap and water, hydrogen peroxide and neosporin. no itching or burning and tenderness around the wound. no fever or chills. states that he is concerned given that the wound has become red.

## 2019-07-31 NOTE — ED PROVIDER NOTE - PHYSICAL EXAMINATION
General survey: Patient is well developed and well nourished. Patient is lying in stretcher, not diaphoretic and does not appear in acute distress.    Pulm: Chest expansion symmetric bilaterally.    Skin: left medial ankle, horizontal abrasion linear appx 8cm. no surrounding erythema or ttp. no drainage. Warm dry and intact. No note of any edema, pallor, jaundice, erythema, ecchymosis, or purpura    Psych: Mood and affect appropriate

## 2019-07-31 NOTE — ED PROVIDER NOTE - NSFOLLOWUPINSTRUCTIONS_ED_ALL_ED_FT
please keep the wound clean and clean with soap and water     please place baitracin on the wound every 12 hours and monitor for infection.    Wound Care, Adult  Taking care of your wound properly can help to prevent pain, infection, and scarring. It can also help your wound to heal more quickly.    How to care for your wound  Wound care     Image Image   Follow instructions from your health care provider about how to take care of your wound. Make sure you:  Wash your hands with soap and water before you change the bandage (dressing). If soap and water are not available, use hand .  Change your dressing as told by your health care provider.  Leave stitches (sutures), skin glue, or adhesive strips in place. These skin closures may need to stay in place for 2 weeks or longer. If adhesive strip edges start to loosen and curl up, you may trim the loose edges. Do not remove adhesive strips completely unless your health care provider tells you to do that.  Check your wound area every day for signs of infection. Check for:  Redness, swelling, or pain.  Fluid or blood.  Warmth.  Pus or a bad smell.  Ask your health care provider if you should clean the wound with mild soap and water. Doing this may include:  Using a clean towel to pat the wound dry after cleaning it. Do not rub or scrub the wound.  Applying a cream or ointment. Do this only as told by your health care provider.  Covering the incision with a clean dressing.  Ask your health care provider when you can leave the wound uncovered.  Keep the dressing dry until your health care provider says it can be removed. Do not take baths, swim, use a hot tub, or do anything that would put the wound underwater until your health care provider approves. Ask your health care provider if you can take showers. You may only be allowed to take sponge baths.  Medicines     Image   If you were prescribed an antibiotic medicine, cream, or ointment, take or use the antibiotic as told by your health care provider. Do not stop taking or using the antibiotic even if your condition improves.  Take over-the-counter and prescription medicines only as told by your health care provider. If you were prescribed pain medicine, take it 30 or more minutes before you do any wound care or as told by your health care provider.  General instructions     Return to your normal activities as told by your health care provider. Ask your health care provider what activities are safe.  Do not scratch or pick at the wound.  Do not use any products that contain nicotine or tobacco, such as cigarettes and e-cigarettes. These may delay wound healing. If you need help quitting, ask your health care provider.  Keep all follow-up visits as told by your health care provider. This is important.  Eat a diet that includes protein, vitamin A, vitamin C, and other nutrient-rich foods to help the wound heal.  Foods rich in protein include meat, dairy, beans, nuts, and other sources.  Foods rich in vitamin A include carrots and dark green, leafy vegetables.  Foods rich in vitamin C include citrus, tomatoes, and other fruits and vegetables.  Nutrient-rich foods have protein, carbohydrates, fat, vitamins, or minerals. Eat a variety of healthy foods including vegetables, fruits, and whole grains.  Contact a health care provider if:  You received a tetanus shot and you have swelling, severe pain, redness, or bleeding at the injection site.  Your pain is not controlled with medicine.  You have redness, swelling, or pain around the wound.  You have fluid or blood coming from the wound.  Your wound feels warm to the touch.  You have pus or a bad smell coming from the wound.  You have a fever or chills.  You are nauseous or you vomit.  You are dizzy.  Get help right away if:  You have a red streak going away from your wound.  The edges of the wound open up and separate.  Your wound is bleeding, and the bleeding does not stop with gentle pressure.  You have a rash.  You faint.  You have trouble breathing.  Summary  Always wash your hands with soap and water before changing your bandage (dressing).  To help with healing, eat foods that are rich in protein, vitamin A, vitamin C, and other nutrients.  Check your wound every day for signs of infection. Contact your health care provider if you suspect that your wound is infected.  This information is not intended to replace advice given to you by your health care provider. Make sure you discuss any questions you have with your health care provider.    Document Released: 09/26/2009 Document Revised: 01/29/2019 Document Reviewed: 07/04/2017  [employer] Interactive Patient Education © 2019 [employer]

## 2019-07-31 NOTE — ED ADULT NURSE NOTE - CHPI ED NUR SYMPTOMS NEG
no drainage/no bleeding at site/no rectal pain/no fever/no purulent drainage/no redness/no chills/no pain/no inflammation/no bleeding

## 2019-08-09 ENCOUNTER — APPOINTMENT (OUTPATIENT)
Dept: HEART AND VASCULAR | Facility: CLINIC | Age: 69
End: 2019-08-09

## 2019-08-09 ENCOUNTER — RX RENEWAL (OUTPATIENT)
Age: 69
End: 2019-08-09

## 2019-08-09 ENCOUNTER — APPOINTMENT (OUTPATIENT)
Dept: HEART AND VASCULAR | Facility: CLINIC | Age: 69
End: 2019-08-09
Payer: MEDICARE

## 2019-08-09 VITALS
BODY MASS INDEX: 32.2 KG/M2 | HEIGHT: 73 IN | DIASTOLIC BLOOD PRESSURE: 78 MMHG | HEART RATE: 68 BPM | OXYGEN SATURATION: 97 % | RESPIRATION RATE: 14 BRPM | TEMPERATURE: 97.9 F | SYSTOLIC BLOOD PRESSURE: 120 MMHG | WEIGHT: 243 LBS
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
## 2019-08-09 DIAGNOSIS — S80.812A ABRASION, LEFT LOWER LEG, INITIAL ENCOUNTER: ICD-10-CM
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
## 2019-08-09 DIAGNOSIS — I10 ESSENTIAL (PRIMARY) HYPERTENSION: ICD-10-CM

## 2019-08-09 DIAGNOSIS — R60.0 LOCALIZED EDEMA: ICD-10-CM

## 2019-08-09 DIAGNOSIS — I51.7 CARDIOMEGALY: ICD-10-CM

## 2019-08-09 DIAGNOSIS — Z48.00 ENCOUNTER FOR CHANGE OR REMOVAL OF NONSURGICAL WOUND DRESSING: ICD-10-CM
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
## 2019-08-09 PROCEDURE — 93306 TTE W/DOPPLER COMPLETE: CPT

## 2019-08-09 PROCEDURE — 99214 OFFICE O/P EST MOD 30 MIN: CPT

## 2019-08-09 PROCEDURE — 36415 COLL VENOUS BLD VENIPUNCTURE: CPT

## 2019-08-09 PROCEDURE — 93000 ELECTROCARDIOGRAM COMPLETE: CPT

## 2019-08-09 RX ORDER — COLCHICINE 0.6 MG/1
0.6 TABLET, FILM COATED ORAL
Refills: 0 | Status: DISCONTINUED | COMMUNITY
End: 2019-08-09

## 2019-08-09 RX ORDER — VALACYCLOVIR 1 G/1
1 TABLET, FILM COATED ORAL
Qty: 10 | Refills: 0 | Status: DISCONTINUED | COMMUNITY
Start: 2019-02-13 | End: 2019-08-09

## 2019-08-09 RX ORDER — ACYCLOVIR 50 MG/G
5 OINTMENT TOPICAL 3 TIMES DAILY
Qty: 1 | Refills: 6 | Status: DISCONTINUED | COMMUNITY
Start: 2019-02-13 | End: 2019-08-09

## 2019-08-09 RX ORDER — FUROSEMIDE 40 MG/1
40 TABLET ORAL DAILY
Qty: 90 | Refills: 1 | Status: DISCONTINUED | COMMUNITY
Start: 2018-03-23 | End: 2019-08-09

## 2019-08-09 RX ORDER — PREDNISONE 5 MG/1
5 TABLET ORAL DAILY
Qty: 360 | Refills: 2 | Status: DISCONTINUED | COMMUNITY
Start: 2018-07-02 | End: 2019-08-09

## 2019-08-09 RX ORDER — ALLOPURINOL 100 MG/1
100 TABLET ORAL DAILY
Qty: 90 | Refills: 3 | Status: DISCONTINUED | COMMUNITY
Start: 2018-07-02 | End: 2019-08-09

## 2019-08-11 DIAGNOSIS — E78.1 PURE HYPERGLYCERIDEMIA: ICD-10-CM

## 2019-08-12 PROBLEM — E78.1 ESSENTIAL HYPERTRIGLYCERIDEMIA: Status: ACTIVE | Noted: 2019-08-12

## 2019-08-12 LAB
ALBUMIN SERPL ELPH-MCNC: 4.5 G/DL
ALP BLD-CCNC: 60 U/L
ALT SERPL-CCNC: 44 U/L
ANION GAP SERPL CALC-SCNC: 13 MMOL/L
APPEARANCE: CLEAR
AST SERPL-CCNC: 16 U/L
BASOPHILS # BLD AUTO: 0.07 K/UL
BASOPHILS NFR BLD AUTO: 1 %
BILIRUB SERPL-MCNC: 0.6 MG/DL
BILIRUBIN URINE: NEGATIVE
BLOOD URINE: NEGATIVE
BUN SERPL-MCNC: 19 MG/DL
CALCIUM SERPL-MCNC: 9.5 MG/DL
CHLORIDE SERPL-SCNC: 103 MMOL/L
CHOLEST SERPL-MCNC: 169 MG/DL
CHOLEST/HDLC SERPL: 6 RATIO
CO2 SERPL-SCNC: 21 MMOL/L
COLOR: NORMAL
CREAT SERPL-MCNC: 0.91 MG/DL
CREAT SPEC-SCNC: 56 MG/DL
EOSINOPHIL # BLD AUTO: 0.3 K/UL
EOSINOPHIL NFR BLD AUTO: 4.5 %
ESTIMATED AVERAGE GLUCOSE: 131 MG/DL
GLUCOSE QUALITATIVE U: NEGATIVE
GLUCOSE SERPL-MCNC: 114 MG/DL
HBA1C MFR BLD HPLC: 6.2 %
HCT VFR BLD CALC: 47 %
HDLC SERPL-MCNC: 28 MG/DL
HGB BLD-MCNC: 15.2 G/DL
IMM GRANULOCYTES NFR BLD AUTO: 0.6 %
KETONES URINE: NEGATIVE
LDLC SERPL CALC-MCNC: NORMAL MG/DL
LEUKOCYTE ESTERASE URINE: NEGATIVE
LYMPHOCYTES # BLD AUTO: 1.65 K/UL
LYMPHOCYTES NFR BLD AUTO: 24.6 %
MAN DIFF?: NORMAL
MCHC RBC-ENTMCNC: 30.2 PG
MCHC RBC-ENTMCNC: 32.3 GM/DL
MCV RBC AUTO: 93.4 FL
MICROALBUMIN 24H UR DL<=1MG/L-MCNC: <1.2 MG/DL
MICROALBUMIN/CREAT 24H UR-RTO: NORMAL MG/G
MONOCYTES # BLD AUTO: 0.59 K/UL
MONOCYTES NFR BLD AUTO: 8.8 %
NEUTROPHILS # BLD AUTO: 4.07 K/UL
NEUTROPHILS NFR BLD AUTO: 60.5 %
NITRITE URINE: NEGATIVE
NT-PROBNP SERPL-MCNC: 65 PG/ML
PH URINE: 5.5
PLATELET # BLD AUTO: 243 K/UL
POTASSIUM SERPL-SCNC: 4.6 MMOL/L
PROT SERPL-MCNC: 6.9 G/DL
PROTEIN URINE: NEGATIVE
RBC # BLD: 5.03 M/UL
RBC # FLD: 14.1 %
SODIUM SERPL-SCNC: 137 MMOL/L
SPECIFIC GRAVITY URINE: 1.01
TESTOST SERPL-MCNC: 154 NG/DL
TRIGL SERPL-MCNC: 410 MG/DL
TSH SERPL-ACNC: 1.34 UIU/ML
UROBILINOGEN URINE: NORMAL
WBC # FLD AUTO: 6.72 K/UL

## 2019-08-14 PROBLEM — I10 HYPERTENSION: Status: ACTIVE | Noted: 2018-03-24

## 2019-08-14 NOTE — REASON FOR VISIT
[Follow-Up - Clinic] : a clinic follow-up of [Abnormal ECG] : an abnormal ECG [Hypertension] : hypertension [FreeTextEntry1] : 69  year old male with HTN and mild type 2 diabetes presents for follow up and blood work and re assessment of  thoracic aortic  dilation . \par  \par last stress echo was in April 2018, nonischemic . Denies hx of MI, CHF, stroke.   He is an active, light smoker  and prior alcohol abuser.  No past hx of thrombophilia. \par \par Denies hematuria, rectal bleeding, syncope, claudication .\par \par Has mild dyspnea on exertion , daytime fatigue

## 2019-08-14 NOTE — REVIEW OF SYSTEMS
[Feeling Fatigued] : feeling fatigued [Shortness Of Breath] : no shortness of breath [Dyspnea on exertion] : dyspnea during exertion [Chest  Pressure] : no chest pressure [Chest Pain] : no chest pain [Lower Ext Edema] : lower extremity edema [Leg Claudication] : no intermittent leg claudication [Palpitations] : no palpitations [Joint Pain] : joint pain [Joint Swelling] : no joint swelling [Joint Stiffness] : joint stiffness [Muscle Cramps] : no muscle cramps [Limb Weakness (Paresis)] : no limb weakness [Change In Color Of Skin] : change in skin color [Negative] : Heme/Lymph [FreeTextEntry1] : insomnia

## 2019-08-14 NOTE — HISTORY OF PRESENT ILLNESS
[FreeTextEntry1] : EKG shows NSR 65 bpm without ectopy, ischemia or LVH  \par \par No hematuria, rectal bleeding or dysphagia \par \par

## 2019-08-14 NOTE — ASSESSMENT
[FreeTextEntry1] : Controlled HTN\par \par ascending aorta dilation \par \par Moderate aortic regurgitation \par \par

## 2019-08-14 NOTE — DISCUSSION/SUMMARY
[Hypertriglyceridemia] : essential hypertriglyceridemia [Lipids Test Panel] : a fasting lipid profile [Hypertension] : hypertension [Low HDL] : low HDL [Essential Hypertension] : essential hypertension [Stable] : stable [Responding to Treatment] : responding to treatment [None] : none [___ Month(s)] : [unfilled] month(s) [With Me] : with me [FreeTextEntry1] : venipuncture performed  to assess lipids, testosterone, etc \par \par Decrease intake of carbohydrates,  reinforce daily fenofibrate use to manage hypertriglyceridemia \par \par reviewed results of echocardiogram ,  ascending aortic diameter is stable

## 2019-11-29 ENCOUNTER — RX RENEWAL (OUTPATIENT)
Age: 69
End: 2019-11-29

## 2019-12-22 ENCOUNTER — RX RENEWAL (OUTPATIENT)
Age: 69
End: 2019-12-22

## 2020-01-09 ENCOUNTER — APPOINTMENT (OUTPATIENT)
Dept: HEART AND VASCULAR | Facility: CLINIC | Age: 70
End: 2020-01-09
Payer: MEDICARE

## 2020-01-09 VITALS
OXYGEN SATURATION: 96 % | HEART RATE: 72 BPM | SYSTOLIC BLOOD PRESSURE: 120 MMHG | DIASTOLIC BLOOD PRESSURE: 76 MMHG | HEIGHT: 73 IN | TEMPERATURE: 96.9 F | WEIGHT: 257 LBS | BODY MASS INDEX: 34.06 KG/M2 | RESPIRATION RATE: 14 BRPM

## 2020-01-09 DIAGNOSIS — M54.5 LOW BACK PAIN: ICD-10-CM

## 2020-01-09 DIAGNOSIS — G89.29 LOW BACK PAIN: ICD-10-CM

## 2020-01-09 DIAGNOSIS — R53.83 OTHER FATIGUE: ICD-10-CM

## 2020-01-09 DIAGNOSIS — I35.1 NONRHEUMATIC AORTIC (VALVE) INSUFFICIENCY: ICD-10-CM

## 2020-01-09 PROCEDURE — 36415 COLL VENOUS BLD VENIPUNCTURE: CPT

## 2020-01-09 PROCEDURE — 99214 OFFICE O/P EST MOD 30 MIN: CPT

## 2020-01-09 RX ORDER — FENOFIBRATE 145 MG/1
145 TABLET, COATED ORAL DAILY
Qty: 90 | Refills: 1 | Status: DISCONTINUED | COMMUNITY
Start: 2019-08-12 | End: 2020-01-09

## 2020-01-09 RX ORDER — ALLOPURINOL 100 MG/1
100 TABLET ORAL DAILY
Qty: 90 | Refills: 3 | Status: DISCONTINUED | COMMUNITY
Start: 2019-08-09 | End: 2020-01-09

## 2020-01-09 NOTE — REASON FOR VISIT
[Follow-Up - Clinic] : a clinic follow-up of [Dyspnea] : dyspnea [Fatigue] : feeling tired (fatigue) [Hyperlipidemia] : hyperlipidemia [Hypertension] : hypertension [FreeTextEntry1] : 69  year old male with HTN and mild type 2 diabetes presents for follow up and blood work and re assessment of  thoracic aortic  dilation with associated moderate aortic regurgitation. \par  \par Last stress echo was in April 2018, nonischemic . Denies hx of MI, CHF, stroke.   He is an active, light smoker  and prior alcohol abuser.  No past hx of thrombophilia. \par \par Denies hematuria, rectal bleeding, syncope, claudication .\par \par has increasing dyspnea on exertion , daytime fatigue

## 2020-01-09 NOTE — ASSESSMENT
[FreeTextEntry1] : Testosterone deficiency\par \par Chronic AR with dilated aortic root\par \par Mild diabetes \par

## 2020-01-09 NOTE — DISCUSSION/SUMMARY
[Essential Hypertension] : essential hypertension [Stable] : stable [Responding to Treatment] : responding to treatment [None] : none [Weight Loss] : weight loss [With Me] : with me [FreeTextEntry1] : venipuncture performed  with testosterone , HgbA1c, lipids, etc \par \par Set up  stress echo

## 2020-01-09 NOTE — REVIEW OF SYSTEMS
[Recent Weight Gain (___ Lbs)] : recent [unfilled] ~Ulb weight gain [Feeling Fatigued] : feeling fatigued [Recent Weight Loss (___ Lbs)] : no recent weight loss [Shortness Of Breath] : no shortness of breath [Dyspnea on exertion] : dyspnea during exertion [Chest  Pressure] : no chest pressure [Chest Pain] : no chest pain [Lower Ext Edema] : lower extremity edema [Leg Claudication] : no intermittent leg claudication [Palpitations] : no palpitations [Joint Pain] : joint pain [Joint Swelling] : no joint swelling [Joint Stiffness] : joint stiffness [Muscle Cramps] : no muscle cramps [Limb Weakness (Paresis)] : no limb weakness [Change In Color Of Skin] : change in skin color [Negative] : Heme/Lymph [FreeTextEntry1] : insomnia

## 2020-01-09 NOTE — HISTORY OF PRESENT ILLNESS
[FreeTextEntry1] : Found previously to have low testosterone\par \par Presents for repeat blood work\par \par Has to get cataract left eye removed \par \par Refuses flu vaccine\par \par Last colonoscopy  2 years ago \par \par

## 2020-01-12 DIAGNOSIS — E11.9 TYPE 2 DIABETES MELLITUS W/OUT COMPLICATIONS: ICD-10-CM

## 2020-01-12 LAB
ALBUMIN SERPL ELPH-MCNC: 4.2 G/DL
ALP BLD-CCNC: 65 U/L
ALT SERPL-CCNC: 29 U/L
ANION GAP SERPL CALC-SCNC: 13 MMOL/L
APPEARANCE: CLEAR
AST SERPL-CCNC: 18 U/L
BASOPHILS # BLD AUTO: 0.06 K/UL
BASOPHILS NFR BLD AUTO: 0.8 %
BILIRUB SERPL-MCNC: 0.8 MG/DL
BILIRUBIN URINE: NEGATIVE
BLOOD URINE: NEGATIVE
BUN SERPL-MCNC: 16 MG/DL
CALCIUM SERPL-MCNC: 9.3 MG/DL
CHLORIDE SERPL-SCNC: 103 MMOL/L
CHOLEST SERPL-MCNC: 150 MG/DL
CHOLEST/HDLC SERPL: 5 RATIO
CO2 SERPL-SCNC: 23 MMOL/L
COLOR: NORMAL
CREAT SERPL-MCNC: 1.1 MG/DL
CREAT SPEC-SCNC: 98 MG/DL
EOSINOPHIL # BLD AUTO: 0.32 K/UL
EOSINOPHIL NFR BLD AUTO: 4.5 %
ESTIMATED AVERAGE GLUCOSE: 140 MG/DL
GLUCOSE QUALITATIVE U: NEGATIVE
GLUCOSE SERPL-MCNC: 152 MG/DL
HBA1C MFR BLD HPLC: 6.5 %
HCT VFR BLD CALC: 48.2 %
HDLC SERPL-MCNC: 30 MG/DL
HGB BLD-MCNC: 14.9 G/DL
IMM GRANULOCYTES NFR BLD AUTO: 0.4 %
KETONES URINE: NEGATIVE
LDLC SERPL CALC-MCNC: 73 MG/DL
LEUKOCYTE ESTERASE URINE: NEGATIVE
LYMPHOCYTES # BLD AUTO: 1.5 K/UL
LYMPHOCYTES NFR BLD AUTO: 21.1 %
MAN DIFF?: NORMAL
MCHC RBC-ENTMCNC: 29.6 PG
MCHC RBC-ENTMCNC: 30.9 GM/DL
MCV RBC AUTO: 95.6 FL
MICROALBUMIN 24H UR DL<=1MG/L-MCNC: <1.2 MG/DL
MICROALBUMIN/CREAT 24H UR-RTO: NORMAL MG/G
MONOCYTES # BLD AUTO: 0.55 K/UL
MONOCYTES NFR BLD AUTO: 7.7 %
NEUTROPHILS # BLD AUTO: 4.65 K/UL
NEUTROPHILS NFR BLD AUTO: 65.5 %
NITRITE URINE: NEGATIVE
PH URINE: 5.5
PLATELET # BLD AUTO: 251 K/UL
POTASSIUM SERPL-SCNC: 4.3 MMOL/L
PROT SERPL-MCNC: 6.7 G/DL
PROTEIN URINE: NEGATIVE
RBC # BLD: 5.04 M/UL
RBC # FLD: 13.9 %
SODIUM SERPL-SCNC: 139 MMOL/L
SPECIFIC GRAVITY URINE: 1.01
TRIGL SERPL-MCNC: 233 MG/DL
TSH SERPL-ACNC: 1.9 UIU/ML
UROBILINOGEN URINE: NORMAL
WBC # FLD AUTO: 7.11 K/UL

## 2020-01-15 LAB
TESTOST BND SERPL-MCNC: 5.9 PG/ML
TESTOST SERPL-MCNC: 188 NG/DL

## 2020-01-21 RX ORDER — CHOLECALCIFEROL (VITAMIN D3) 1250 MCG
1.25 MG CAPSULE ORAL
Qty: 10 | Refills: 1 | Status: ACTIVE | COMMUNITY
Start: 2018-03-23 | End: 1900-01-01

## 2020-02-04 ENCOUNTER — APPOINTMENT (OUTPATIENT)
Dept: HEART AND VASCULAR | Facility: CLINIC | Age: 70
End: 2020-02-04
Payer: MEDICARE

## 2020-02-04 VITALS
RESPIRATION RATE: 14 BRPM | OXYGEN SATURATION: 95 % | HEIGHT: 73 IN | BODY MASS INDEX: 34.06 KG/M2 | WEIGHT: 257 LBS | DIASTOLIC BLOOD PRESSURE: 80 MMHG | HEART RATE: 63 BPM | SYSTOLIC BLOOD PRESSURE: 126 MMHG

## 2020-02-04 DIAGNOSIS — E66.9 OBESITY, UNSPECIFIED: ICD-10-CM

## 2020-02-04 DIAGNOSIS — I35.1 NONRHEUMATIC AORTIC (VALVE) INSUFFICIENCY: ICD-10-CM

## 2020-02-04 PROCEDURE — 93351 STRESS TTE COMPLETE: CPT

## 2020-03-27 ENCOUNTER — RX RENEWAL (OUTPATIENT)
Age: 70
End: 2020-03-27

## 2020-06-01 ENCOUNTER — APPOINTMENT (OUTPATIENT)
Dept: HEART AND VASCULAR | Facility: CLINIC | Age: 70
End: 2020-06-01

## 2020-08-15 ENCOUNTER — RX RENEWAL (OUTPATIENT)
Age: 70
End: 2020-08-15

## 2020-08-26 NOTE — ASSESSMENT
[FreeTextEntry1] : genital herpes outbreak \par \par \par HTN controlled\par \par \par type 2 diabetes  SVT converted to NSR with 6mg adenosine, monitor, cards consult, check labs

## 2020-10-19 NOTE — ED ADULT NURSE NOTE - CCCP TRG CHIEF CMPLNT
medical evaluation Burow's Graft Text: The defect edges were debeveled with a #15 scalpel blade.  Given the location of the defect, shape of the defect, the proximity to free margins and the presence of a standing cone deformity a Burow's skin graft was deemed most appropriate. The standing cone was removed and this tissue was then trimmed to the shape of the primary defect. The adipose tissue was also removed until only dermis and epidermis were left.  The skin margins of the secondary defect were undermined to an appropriate distance in all directions utilizing iris scissors.  The secondary defect was closed with interrupted buried subcutaneous sutures.  The skin edges were then re-apposed with running  sutures.  The skin graft was then placed in the primary defect and oriented appropriately.

## 2020-11-14 ENCOUNTER — RX RENEWAL (OUTPATIENT)
Age: 70
End: 2020-11-14

## 2021-04-03 ENCOUNTER — RX RENEWAL (OUTPATIENT)
Age: 71
End: 2021-04-03

## 2021-04-11 RX ORDER — LIDOCAINE 5 G/100G
5 OINTMENT TOPICAL TWICE DAILY
Qty: 360 | Refills: 2 | Status: ACTIVE | COMMUNITY
Start: 2021-04-11 | End: 1900-01-01

## 2021-04-13 ENCOUNTER — RX RENEWAL (OUTPATIENT)
Age: 71
End: 2021-04-13

## 2021-06-15 ENCOUNTER — TRANSCRIPTION ENCOUNTER (OUTPATIENT)
Age: 71
End: 2021-06-15

## 2021-07-02 ENCOUNTER — RX RENEWAL (OUTPATIENT)
Age: 71
End: 2021-07-02

## 2021-07-23 ENCOUNTER — RX RENEWAL (OUTPATIENT)
Age: 71
End: 2021-07-23

## 2021-07-23 RX ORDER — CALCIPOTRIENE 0.05 MG/ML
0.01 SOLUTION TOPICAL
Qty: 60 | Refills: 3 | Status: ACTIVE | COMMUNITY
Start: 2021-07-23 | End: 1900-01-01

## 2021-07-23 RX ORDER — DICLOFENAC SODIUM 1% 10 MG/G
1 GEL TOPICAL
Qty: 1200 | Refills: 3 | Status: ACTIVE | COMMUNITY
Start: 2021-07-23 | End: 1900-01-01

## 2021-07-23 RX ORDER — 70%ISOPROPYL ALCOHOL 0.7 ML/ML
70 SWAB TOPICAL
Qty: 1 | Refills: 3 | Status: ACTIVE | COMMUNITY
Start: 2021-07-23 | End: 1900-01-01

## 2021-07-23 RX ORDER — OMEGA-3-ACID ETHYL ESTERS CAPSULES 1 G/1
1 CAPSULE, LIQUID FILLED ORAL TWICE DAILY
Qty: 360 | Refills: 1 | Status: ACTIVE | COMMUNITY
Start: 2021-04-11 | End: 1900-01-01

## 2021-07-26 ENCOUNTER — RX RENEWAL (OUTPATIENT)
Age: 71
End: 2021-07-26

## 2021-07-26 RX ORDER — CALCIPOTRIENE 50 UG/G
0.01 CREAM TOPICAL
Qty: 360 | Refills: 3 | Status: ACTIVE | COMMUNITY
Start: 2021-07-26 | End: 1900-01-01

## 2021-09-04 NOTE — ED ADULT NURSE NOTE - PRIMARY CARE PROVIDER
INTERVAL HPI/OVERNIGHT EVENTS:    Patient S&E at bedside. No o/n events. Patient is intubated and sedated. Denies pain or discomfort.    VITAL SIGNS:  T(F): 98.5 (09-04-21 @ 16:00)  HR: 67 (09-04-21 @ 16:00)  BP: 124/67 (09-04-21 @ 16:00)  RR: 19 (09-04-21 @ 16:00)  SpO2: 99% (09-04-21 @ 16:00)  Wt(kg): --    PHYSICAL EXAM:  Constitutional:  NAD, intubated and sedated  Eyes: sclera non-icteric  Extremities: no c/c/e    MEDICATIONS  (STANDING):  albumin human 25% IVPB 100 milliLiter(s) IV Intermittent every 6 hours  allopurinol 100 milliGRAM(s) Oral daily  chlorhexidine 0.12% Liquid 15 milliLiter(s) Oral Mucosa every 12 hours  chlorhexidine 4% Liquid 1 Application(s) Topical <User Schedule>  dexMEDEtomidine Infusion 0.5 MICROgram(s)/kG/Hr (7.83 mL/Hr) IV Continuous <Continuous>  dextrose 40% Gel 15 Gram(s) Oral once  dextrose 5%. 1000 milliLiter(s) (100 mL/Hr) IV Continuous <Continuous>  dextrose 5%. 1000 milliLiter(s) (50 mL/Hr) IV Continuous <Continuous>  dextrose 50% Injectable 25 Gram(s) IV Push once  dextrose 50% Injectable 12.5 Gram(s) IV Push once  dextrose 50% Injectable 25 Gram(s) IV Push once  doxorubicin IVPB w/etoposide (eMAR) 18 milliGRAM(s) IV Intermittent every 24 hours  glucagon  Injectable 1 milliGRAM(s) IntraMuscular once  heparin   Injectable 5000 Unit(s) SubCutaneous every 8 hours  insulin lispro (ADMELOG) corrective regimen sliding scale   SubCutaneous every 6 hours  isoniazid 300 milliGRAM(s) Oral every 24 hours  norepinephrine Infusion 0.05 MICROgram(s)/kG/Min (2.93 mL/Hr) IV Continuous <Continuous>  petrolatum Ophthalmic Ointment 1 Application(s) Both EYES two times a day  polyethylene glycol 3350 17 Gram(s) Oral daily  pyridoxine 50 milliGRAM(s) Oral daily  senna Syrup 15 milliLiter(s) Oral at bedtime  sevelamer carbonate Powder 1200 milliGRAM(s) Oral three times a day with meals  sodium bicarbonate 1300 milliGRAM(s) Oral two times a day    MEDICATIONS  (PRN):  bisacodyl Suppository 10 milliGRAM(s) Rectal daily PRN Constipation  hydrocortisone sodium succinate Injectable 100 milliGRAM(s) IV Push once PRN PRN Chemotherapy Reaction  meperidine     Injectable 25 milliGRAM(s) IV Push once PRN PRN Chemotherapy Reaction (Rigors)  ondansetron Injectable 8 milliGRAM(s) IV Push every 8 hours PRN Nausea      Allergies    penicillins (Rash)    Intolerances        LABS:                        9.0    12.41 )-----------( 198      ( 04 Sep 2021 11:53 )             26.3     09-04    135  |  102  |  74<H>  ----------------------------<  98  4.2   |  17<L>  |  1.90<H>    Ca    7.1<L>      04 Sep 2021 11:53  Phos  6.5     09-04  Mg     1.70     09-04    TPro  5.0<L>  /  Alb  2.7<L>  /  TBili  0.8  /  DBili  x   /  AST  32  /  ALT  12  /  AlkPhos  78  09-04    PT/INR - ( 04 Sep 2021 04:30 )   PT: 11.6 sec;   INR: 1.02 ratio         PTT - ( 04 Sep 2021 04:30 )  PTT:28.1 sec      RADIOLOGY & ADDITIONAL TESTS:  Studies reviewed.     md

## 2021-10-21 ENCOUNTER — APPOINTMENT (OUTPATIENT)
Dept: HEART AND VASCULAR | Facility: CLINIC | Age: 71
End: 2021-10-21
Payer: MEDICARE

## 2021-10-21 VITALS
TEMPERATURE: 97.8 F | DIASTOLIC BLOOD PRESSURE: 74 MMHG | OXYGEN SATURATION: 96 % | SYSTOLIC BLOOD PRESSURE: 128 MMHG | BODY MASS INDEX: 32.73 KG/M2 | HEIGHT: 73 IN | WEIGHT: 246.98 LBS | HEART RATE: 73 BPM

## 2021-10-21 DIAGNOSIS — R73.03 PREDIABETES.: ICD-10-CM

## 2021-10-21 DIAGNOSIS — I71.2 THORACIC AORTIC ANEURYSM, W/OUT RUPTURE: ICD-10-CM

## 2021-10-21 DIAGNOSIS — E11.9 TYPE 2 DIABETES MELLITUS W/OUT COMPLICATIONS: ICD-10-CM

## 2021-10-21 DIAGNOSIS — R06.00 DYSPNEA, UNSPECIFIED: ICD-10-CM

## 2021-10-21 DIAGNOSIS — E66.9 OBESITY, UNSPECIFIED: ICD-10-CM

## 2021-10-21 DIAGNOSIS — I35.1 NONRHEUMATIC AORTIC (VALVE) INSUFFICIENCY: ICD-10-CM

## 2021-10-21 DIAGNOSIS — E34.9 ENDOCRINE DISORDER, UNSPECIFIED: ICD-10-CM

## 2021-10-21 DIAGNOSIS — M10.9 GOUT, UNSPECIFIED: ICD-10-CM

## 2021-10-21 DIAGNOSIS — I11.9 HYPERTENSIVE HEART DISEASE W/OUT HEART FAILURE: ICD-10-CM

## 2021-10-21 DIAGNOSIS — I77.810 THORACIC AORTIC ECTASIA: ICD-10-CM

## 2021-10-21 DIAGNOSIS — E78.5 HYPERLIPIDEMIA, UNSPECIFIED: ICD-10-CM

## 2021-10-21 PROCEDURE — 93880 EXTRACRANIAL BILAT STUDY: CPT

## 2021-10-21 PROCEDURE — 93000 ELECTROCARDIOGRAM COMPLETE: CPT

## 2021-10-21 PROCEDURE — 93306 TTE W/DOPPLER COMPLETE: CPT

## 2021-10-21 PROCEDURE — 99214 OFFICE O/P EST MOD 30 MIN: CPT | Mod: 25

## 2021-10-21 PROCEDURE — 36415 COLL VENOUS BLD VENIPUNCTURE: CPT

## 2021-10-22 PROBLEM — R06.00 DYSPNEA: Status: ACTIVE | Noted: 2018-03-24

## 2021-10-22 PROBLEM — I35.1 AORTIC INSUFFICIENCY: Status: ACTIVE | Noted: 2021-10-22

## 2021-10-22 PROBLEM — I11.9 HYPERTENSIVE HEART DISEASE: Status: ACTIVE | Noted: 2021-10-22

## 2021-10-22 PROBLEM — E11.9 NON-INSULIN DEPENDENT TYPE 2 DIABETES MELLITUS: Status: ACTIVE | Noted: 2021-10-22

## 2021-10-22 PROBLEM — E78.5 DYSLIPIDEMIA: Status: ACTIVE | Noted: 2018-03-24

## 2021-10-22 PROBLEM — I71.2 THORACIC AORTIC ANEURYSM: Status: ACTIVE | Noted: 2021-10-22

## 2021-10-22 PROBLEM — E66.9 OBESITY: Status: ACTIVE | Noted: 2021-10-22

## 2021-10-22 NOTE — ASSESSMENT
[FreeTextEntry1] : Controlled HTN\par \par Mild type 2 diabetes \par \par Thoracic aortic aneurysm  with moderate MR \par \par \par BMI 33\par \par pseudoclaudication

## 2021-10-22 NOTE — REASON FOR VISIT
[Symptom and Test Evaluation] : symptom and test evaluation [Structural Heart and Valve Disease] : structural heart and valve disease [Hyperlipidemia] : hyperlipidemia [Hypertension] : hypertension [FreeTextEntry1] : 71   year old male with HTN and mild type 2 diabetes presents for follow up and blood work and  with  assessment of  thoracic aortic  dilation with associated moderate aortic regurgitation. \par  \par Last stress echo was in October 2020, nonischemic . \par \par No  hx of MI, CHF, stroke.   \par \par

## 2021-10-22 NOTE — REVIEW OF SYSTEMS
[Feeling Fatigued] : feeling fatigued [Weight Loss (___ Lbs)] : [unfilled] ~Ulb weight loss [Dyspnea on exertion] : dyspnea during exertion [Lower Ext Edema] : lower extremity edema [Joint Pain] : joint pain [Joint Stiffness] : joint stiffness [Negative] : Heme/Lymph [FreeTextEntry5] : pseudoclaudication

## 2021-10-22 NOTE — PHYSICAL EXAM
[Well Developed] : well developed [Well Nourished] : well nourished [No Acute Distress] : no acute distress [Obese] : obese [Normal Conjunctiva] : normal conjunctiva [No Xanthelasma] : no xanthelasma [Normal Venous Pressure] : normal venous pressure [No Carotid Bruit] : no carotid bruit [Normal S1, S2] : normal S1, S2 [No Murmur] : no murmur [No Rub] : no rub [No Gallop] : no gallop [Good Air Entry] : good air entry [Clear Lung Fields] : clear lung fields [No Respiratory Distress] : no respiratory distress  [Soft] : abdomen soft [Non Tender] : non-tender [No Masses/organomegaly] : no masses/organomegaly [Normal Bowel Sounds] : normal bowel sounds [Normal Gait] : normal gait [Gait - Sufficient for Exercise Testing] : gait - sufficient for exercise testing [No Cyanosis] : no cyanosis [No Clubbing] : no clubbing [No Varicosities] : no varicosities [Normal PT B/L] : normal PT B/L [Normal DP B/L] : normal DP B/L [Edema ___] : edema [unfilled] [No Rash] : no rash [No Skin Lesions] : no skin lesions [Moves all extremities] : moves all extremities [No Focal Deficits] : no focal deficits [Normal Speech] : normal speech [Alert and Oriented] : alert and oriented [Normal memory] : normal memory [de-identified] : BMI 33  [de-identified] : anicteric

## 2021-10-22 NOTE — DISCUSSION/SUMMARY
[Essential Hypertension] : essential hypertension [Stable] : stable [Responding to Treatment] : responding to treatment [None] : There are no changes in medication management [Weight Loss] : weight loss [With Me] : with me [___ Month(s)] : in [unfilled] month(s) [FreeTextEntry1] : Echocardiogram results reviewed with patient ,  thoracic aortic aneurysm expanding  , now ~ 5.0cm  with moderate AR.\par \par venipuncture performed with Covid Ab , A1c, lipids, testosterone, etc\par \par Rx for CT angiogram of aorta \par \par Advised against anaerobic  exercise, heavy lifting \par \par Adamantly refuses covid vaccination /flu vaccines \par \par  topical testosterone supplementation ordered \par

## 2021-10-22 NOTE — HISTORY OF PRESENT ILLNESS
[FreeTextEntry1] : He is an active, light smoker  and prior alcohol abuser.  No past hx of thrombophilia. \par \par Denies hematuria, rectal bleeding, syncope,  but has pseudoclaudication symptoms \par \par Denies personal hx of covid, he refuses  covid vaccination or flu \par \par EKG shows NSR 67 bpm with chronic early repolarization pattern  without LVH, ectopy or acute ischemia . There are chronic micro Q waves in V5-V6 \par \par Requires blood work today \par \par Echocardiogram to assess status of thoracic aortic aneurysm \par \par Denies chest pain but often feels low energy and complaints of leg heaviness  and "mottled" appearance \par \par

## 2021-10-25 LAB
ALBUMIN SERPL ELPH-MCNC: 4.6 G/DL
ALP BLD-CCNC: 72 U/L
ALT SERPL-CCNC: 26 U/L
ANION GAP SERPL CALC-SCNC: 15 MMOL/L
AST SERPL-CCNC: 18 U/L
BASOPHILS # BLD AUTO: 0.06 K/UL
BASOPHILS NFR BLD AUTO: 0.7 %
BILIRUB SERPL-MCNC: 0.5 MG/DL
BUN SERPL-MCNC: 20 MG/DL
CALCIUM SERPL-MCNC: 9.5 MG/DL
CHLORIDE SERPL-SCNC: 106 MMOL/L
CHOLEST SERPL-MCNC: 188 MG/DL
CO2 SERPL-SCNC: 20 MMOL/L
COVID-19 NUCLEOCAPSID  GAM ANTIBODY INTERPRETATION: NEGATIVE
COVID-19 SPIKE DOMAIN ANTIBODY INTERPRETATION: NEGATIVE
CREAT SERPL-MCNC: 1.05 MG/DL
EOSINOPHIL # BLD AUTO: 0.3 K/UL
EOSINOPHIL NFR BLD AUTO: 3.5 %
ESTIMATED AVERAGE GLUCOSE: 151 MG/DL
GLUCOSE SERPL-MCNC: 104 MG/DL
HBA1C MFR BLD HPLC: 6.9 %
HCT VFR BLD CALC: 47.4 %
HDLC SERPL-MCNC: 31 MG/DL
HGB BLD-MCNC: 15.5 G/DL
IMM GRANULOCYTES NFR BLD AUTO: 0.4 %
LDLC SERPL CALC-MCNC: 90 MG/DL
LYMPHOCYTES # BLD AUTO: 1.8 K/UL
LYMPHOCYTES NFR BLD AUTO: 21 %
MAN DIFF?: NORMAL
MCHC RBC-ENTMCNC: 31.1 PG
MCHC RBC-ENTMCNC: 32.7 GM/DL
MCV RBC AUTO: 95.2 FL
MONOCYTES # BLD AUTO: 0.73 K/UL
MONOCYTES NFR BLD AUTO: 8.5 %
NEUTROPHILS # BLD AUTO: 5.64 K/UL
NEUTROPHILS NFR BLD AUTO: 65.9 %
NONHDLC SERPL-MCNC: 157 MG/DL
PLATELET # BLD AUTO: 242 K/UL
POTASSIUM SERPL-SCNC: 4.5 MMOL/L
PROT SERPL-MCNC: 7.3 G/DL
PSA FREE FLD-MCNC: 28 %
PSA FREE SERPL-MCNC: 0.12 NG/ML
PSA SERPL-MCNC: 0.44 NG/ML
RBC # BLD: 4.98 M/UL
RBC # FLD: 14.1 %
SARS-COV-2 AB SERPL IA-ACNC: 0.4 U/ML
SARS-COV-2 AB SERPL QL IA: 0.08 INDEX
SODIUM SERPL-SCNC: 141 MMOL/L
TESTOST BND SERPL-MCNC: 6.2 PG/ML
TESTOSTERONE TOTAL S: 135 NG/DL
TRIGL SERPL-MCNC: 332 MG/DL
TSH SERPL-ACNC: 1.01 UIU/ML
WBC # FLD AUTO: 8.56 K/UL

## 2021-10-25 RX ORDER — METFORMIN HYDROCHLORIDE 500 MG/1
500 TABLET, COATED ORAL 3 TIMES DAILY
Qty: 270 | Refills: 1 | Status: ACTIVE | COMMUNITY
Start: 2020-01-12 | End: 1900-01-01

## 2021-11-03 RX ORDER — ROSUVASTATIN CALCIUM 20 MG/1
20 TABLET, FILM COATED ORAL
Qty: 90 | Refills: 1 | Status: ACTIVE | COMMUNITY
Start: 2021-11-03 | End: 1900-01-01

## 2022-03-08 RX ORDER — TESTOSTERONE 25 MG/2.5G
25 MG/2.5GM GEL TRANSDERMAL
Qty: 30 | Refills: 0 | Status: ACTIVE | COMMUNITY
Start: 2020-01-21 | End: 1900-01-01

## 2022-03-23 ENCOUNTER — RX RENEWAL (OUTPATIENT)
Age: 72
End: 2022-03-23

## 2022-04-25 ENCOUNTER — RX RENEWAL (OUTPATIENT)
Age: 72
End: 2022-04-25

## 2022-04-25 RX ORDER — ATENOLOL 25 MG/1
25 TABLET ORAL DAILY
Qty: 90 | Refills: 3 | Status: ACTIVE | COMMUNITY
Start: 2018-03-23 | End: 1900-01-01

## 2022-06-07 NOTE — ED ADULT NURSE NOTE - CAS EDN INTEG ASSESS
WDL
FAMILY HISTORY:  Father  Still living? Unknown  Family history of CABG, Age at diagnosis: 51-60

## 2022-09-25 ENCOUNTER — EMERGENCY (EMERGENCY)
Facility: HOSPITAL | Age: 72
LOS: 1 days | Discharge: HOME | End: 2022-09-25
Attending: EMERGENCY MEDICINE | Admitting: EMERGENCY MEDICINE
Payer: MEDICARE

## 2022-09-25 VITALS
TEMPERATURE: 98 F | HEIGHT: 73 IN | HEART RATE: 81 BPM | OXYGEN SATURATION: 97 % | DIASTOLIC BLOOD PRESSURE: 89 MMHG | SYSTOLIC BLOOD PRESSURE: 142 MMHG | WEIGHT: 229.94 LBS | RESPIRATION RATE: 16 BRPM

## 2022-09-25 VITALS
OXYGEN SATURATION: 98 % | HEART RATE: 78 BPM | SYSTOLIC BLOOD PRESSURE: 132 MMHG | DIASTOLIC BLOOD PRESSURE: 87 MMHG | RESPIRATION RATE: 18 BRPM

## 2022-09-25 PROCEDURE — 93971 EXTREMITY STUDY: CPT | Mod: 26,RT

## 2022-09-25 PROCEDURE — 99284 EMERGENCY DEPT VISIT MOD MDM: CPT | Mod: 25

## 2022-09-25 PROCEDURE — 99284 EMERGENCY DEPT VISIT MOD MDM: CPT

## 2022-09-25 PROCEDURE — 93971 EXTREMITY STUDY: CPT

## 2022-09-25 NOTE — ED PROVIDER NOTE - NS ED ROS FT
CONSTITUTIONAL: Denies fever and chills    HEENT: Denies changes in vision and hearing.    RESPIRATORY: Denies SOB and cough.    CARDIOVASCULAR: Denies palpitations and chest pain.    MUSCULOSKELETAL: +R calf pain    SKIN: Denies rash and pruritus.    NEUROLOGICAL: Denies numbness/tingling/weakness to extremities

## 2022-09-25 NOTE — ED PROVIDER NOTE - NSFOLLOWUPINSTRUCTIONS_ED_ALL_ED_FT
Thank you for visiting [facility] Emergency Department.      We saw you today for calf pain.    PAIN CONTROL:   You may take ibuprofen (Motrin, Advil) 600 mg (3 regular tablets) every 6 hours as needed for pain.  Please take with food.  Stop taking if you develop abdominal pain, dark/ bloody stools.  Do not mix with other NSAIDS (ie. Naproxen, Aleve, Celecoxib).  You may also take acetaminophen (Tylenol) 650-975mg (2-3 regular tablets) or 500-1000mg (1-2 extra strength tablets) every 6 hours as needed for pain.  Do not exceed 4000 mg in 1 day. These medications may be bought over the counter.    I recommend alternating the Ibuprofen and Tylenol so you are getting medications around the clock.  For example take the Ibuprofen, then 3 hours later take the Tylenol, then 3 hours later take the Ibuprofen, and repeat as needed.  Rest. Apply ice to affected area 20 minutes on, then 20 minutes off.  You may repeat throughout the day.  Please wear ACE wrap as instructed. Elevate affected extremity.    If your pain does not improve or worsens despite these measures, you should seek medical attention and/or may need to follow up with a specialist.    Please know that no emergency visit is complete without follow-up with your primary care provider in 1 week.  Please bring copies of all discharge papers and results and show to your doctor.      Please continue taking all previous medications as instructed unless we discussed otherwise.     I appreciated your patience and hope you feel better soon.     Return to ER immediately if you develop fevers, chills, chest pain, shortness of breath, worsening pain or swelling, if you develop numbness/tingling/weakness to the extremity, and/or any concerning symptoms.

## 2022-09-25 NOTE — ED PROVIDER NOTE - OBJECTIVE STATEMENT
71 y/o male w/ hx htn. preDM, thoracic aortic aneurysm, HLD p/w atraumatic intermittent R calf cramping and swelling x 3 days.  Denies known injury.  Tried aleve with some relief.  Denies f/c, cough, cp, sob, numbness/tingling/weakness to ext.  Denies asa/ac use.  States occasionally smokes cigars.  Denies recent travel/ immobilization, denies prior hx dvt/pe.  States follows regularly with specialist for aortic aneurysm, reports last imaging few mo ago was stable, is scheduled to f/u on Oct 4th.

## 2022-09-25 NOTE — ED PROVIDER NOTE - PHYSICAL EXAMINATION
CONSTITUTIONAL: Awake, alert.  Nontoxic, no acute distress.    HEAD: Normocephalic, atraumatic.    HEART:  Normal rate, regular rhythm.  Heart sounds S1, S2.  No murmurs, rubs or gallops.    LUNGS:  No acute respiratory distress.  Non-tachypneic and non-labored.  Lungs are clear bilaterally with good aeration.  No wheezing, rales, rhonchi.    MUSCULOSKELETAL:  +mild ecchymosis/ hematoma to R calf with slight ttp.  FROM b/l lower extremities.  5/5 strength b/l upper and lower ext.  Sensation and motor function grossly intact.  Strong equal peripheral pulses b/l.   Cap refill < 2 b/l lower ext.  All compartments soft.    SKIN: See above. Skin in warm, dry and intact    NEUROLOGICAL:  Patient is alert, oriented x person, place and time.    PSYCH: Appropriate mood and affect. Good judgment and insight.

## 2022-09-25 NOTE — ED ADULT TRIAGE NOTE - CHIEF COMPLAINT QUOTE
pt c/o Right LE cramping and swelling x 3 days. denies cp, sob or injuries. pmh htn, and pre-diabetes.

## 2022-09-25 NOTE — ED PROVIDER NOTE - CLINICAL SUMMARY MEDICAL DECISION MAKING FREE TEXT BOX
71 y/o male w/ hx htn. preDM, thoracic aortic aneurysm, HLD p/w atraumatic intermittent R calf cramping and swelling x 3 days.  Exam notable for slight hematoma/ ecchymosis to R calf w/ mild ttp  Pt is occasional cigar smoker  Will screen for DVT with Doppler  Sx may be 2/2 hematoma  Pain control, prn  --  Prelim read US: No deep vein thrombosis seen.  Discussed results with pt  Discussed may be 2/2 hematoma. will give rice, nsaids/tylenol for pain  compartments soft, NVI  Will DC with strict return precautions, f/u pmd

## 2022-09-25 NOTE — ED PROVIDER NOTE - ATTENDING APP SHARED VISIT CONTRIBUTION OF CARE
73 yo , ho of HTN, DM pre, thoracic aortic aneurysm under monitoring, HLD,   here w 3 days of intermittent atraumatic Rt calf pain, +pulses , tender below contusion on calf, most likely contusion, will U/s TO R/O dvt .   IF ok stable for d/c and pcp f/u

## 2022-09-25 NOTE — ED ADULT NURSE NOTE - OBJECTIVE STATEMENT
pt c/o right leg cramping x 3 days pt c/o right leg cramping and swelling of RLE x 3 days , pain on walking , denies CP/SOB

## 2022-09-25 NOTE — ED ADULT NURSE NOTE - NEURO BEHAVIOR
Virtual Regular Visit    This note was not shared with the patient due to this is a psychotherapy note    Assessment/Plan:    Problem List Items Addressed This Visit        Other    Generalized anxiety disorder - Primary    Post traumatic stress disorder (PTSD)        Goals addressed in session: Goal 1      Reason for visit is Behavioral Health session, conducted through video, due to COVID-19 precautions  Leonela Nava has verbalized a preference to continue with virtual sessions at this time  Leonela Nava has been offered in-person sessions and has declined  Encounter provider UTE Dobbins    Provider located at [address] [place] [id number] [phone]      Recent Visits  No visits were found meeting these conditions  Showing recent visits within past 7 days and meeting all other requirements     Future Appointments  No visits were found meeting these conditions  Showing future appointments within next 150 days and meeting all other requirements        The patient was identified by name and date of birth  Rosa Boothe was informed that this is a telemedicine visit and that the visit is being conducted through [employer] and patient was informed that this is a secure, HIPAA-compliant platform  She agrees to proceed     My office door was closed  The patient was notified the following individuals were present in the room: Otf Mistry, student intern  She acknowledged consent and understanding of privacy and security of the video platform  The patient has agreed to participate and understands they can discontinue the visit at any time  Patient is aware this is a billable service  Subjective  Rosa Boothe is a 36 y o  female  DATA: Met with Leonela Nava for scheduled individual session  This session was attended by Otf Mistry, Student Intern   Leonela Nava provided verbal consent for the Student Intern to sit in on this and future sessions  She acknowledges understanding that she can opt out of student observations/participation at any time  Topics of discussion included Monses daughter finally getting her NG tube out  Diana Juarez stated she is excited to see her babys face without a NG tube  This started Diana Juarez to talk about her past pregnancies and her experiences with the good and the bad  Diana Juarez did state that her relationship concerns with her finance have been good these last few weeks  Diana Juarez states they are talking more and that she can see he is trying more  Diana Juarez expressed that she is calling around to try and see a couples counselor as she hasnt heard back yet from intake  Client shows evidence of utilizing coping skills to manage mental health symptoms be starting her exercise routine again  During this session, this clinician used the following therapeutic modalities: client-centered and solution focused  The clinician assigned the following for the client to complete prior to the next session: calling around to see if they can get in to see couples counselor  ASSESSMENT: Diana Juarez presents with a normal mood  Diana Juarez has a full-range of affect which is congruent with mood  Diana Juarez exhibits good with this clinician  Diana Juarez continues to exhibit willingness to work on treatment goals and objectives  Diana Juarez presents with a minimal risk of suicide, minimal risk of self-harm, and minimal risk of harm to others  PLAN: Diana Juarez will return in two weeks for the next scheduled session  Between sessions, Diana Juarez will work on keeping up with her exercises and will report back during the next session re: successes and barriers  At the next session, this clinician will use client-centered and solution focus to address relationship concerns, in an effort to assist with meeting treatment goals  HPI     Past Medical History:   Diagnosis Date    Asthma        No past surgical history on file      Current Outpatient Medications   Medication Sig Dispense Refill    beclomethasone (QVAR REDIHALER) 40 MCG/ACT inhaler Inhale 2 puffs 2 (two) times a day      fluticasone (FLONASE) 50 mcg/act nasal spray 2 sprays into each nostril      loratadine (CLARITIN) 10 mg tablet Take 10 mg by mouth      montelukast (SINGULAIR) 10 mg tablet Take 10 mg by mouth       No current facility-administered medications for this visit  Allergies   Allergen Reactions    Aspirin Shortness Of Breath    Aspirin Buf([id number]-[id number]) Chest Pain, Cough and Shortness Of Breath    Naproxen Shortness Of Breath    Penicillins Hives, Itching, Shortness Of Breath and Wheezing    Latex Itching    Hydrocodone-Acetaminophen Rash       Review of Systems    Video Exam    There were no vitals filed for this visit  Physical Exam     I spent 45 minutes directly with the patient during this visit      VIRTUAL VISIT DISCLAIMER    Shannon Baron acknowledges that she has consented to an online visit or consultation  She understands that the online visit is based solely on information provided by her, and that, in the absence of a face-to-face physical evaluation by the physician, the diagnosis she receives is both limited and provisional in terms of accuracy and completeness  This is not intended to replace a full medical face-to-face evaluation by the physician  Shannon Baron understands and accepts these terms  calm

## 2022-09-25 NOTE — ED ADULT NURSE NOTE - NSIMPLEMENTINTERV_GEN_ALL_ED
Implemented All Universal Safety Interventions:  [place] to call system. Call bell, personal items and telephone within reach. Instruct patient to call for assistance. Room bathroom lighting operational. Non-slip footwear when patient is off stretcher. Physically safe environment: no spills, clutter or unnecessary equipment. Stretcher in lowest position, wheels locked, appropriate side rails in place.

## 2022-09-25 NOTE — ED PROVIDER NOTE - NSICDXPASTMEDICALHX_GEN_ALL_CORE_FT
Private Residence
PAST MEDICAL HISTORY:  Aortic arch aneurysm     DM (diabetes mellitus)     HTN (hypertension), benign

## 2022-09-25 NOTE — ED PROVIDER NOTE - PATIENT PORTAL LINK FT
You can access the FollowMyHealth Patient Portal offered by [facility] by registering at the following website: [email or web address]. By joining [facility]’s FollowMyHealth portal, you will also be able to view your health information using other applications (apps) compatible with our system.

## 2022-09-27 DIAGNOSIS — E11.9 TYPE 2 DIABETES MELLITUS WITHOUT COMPLICATIONS: ICD-10-CM
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
## 2022-09-27 DIAGNOSIS — F17.290 NICOTINE DEPENDENCE, OTHER TOBACCO PRODUCT, UNCOMPLICATED: ICD-10-CM

## 2022-09-27 DIAGNOSIS — M79.661 PAIN IN RIGHT LOWER LEG: ICD-10-CM

## 2022-09-27 DIAGNOSIS — I71.2 THORACIC AORTIC ANEURYSM, WITHOUT RUPTURE: ICD-10-CM

## 2022-09-27 DIAGNOSIS — I10 ESSENTIAL (PRIMARY) HYPERTENSION: ICD-10-CM

## 2022-09-27 DIAGNOSIS — E78.5 HYPERLIPIDEMIA, UNSPECIFIED: ICD-10-CM

## 2022-09-27 DIAGNOSIS — R22.41 LOCALIZED SWELLING, MASS AND LUMP, RIGHT LOWER LIMB: ICD-10-CM

## 2022-09-27 DIAGNOSIS — Z88.0 ALLERGY STATUS TO PENICILLIN: ICD-10-CM

## 2022-09-27 DIAGNOSIS — Z79.84 LONG TERM (CURRENT) USE OF ORAL HYPOGLYCEMIC DRUGS: ICD-10-CM
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
## 2022-09-27 DIAGNOSIS — S80.11XA CONTUSION OF RIGHT LOWER LEG, INITIAL ENCOUNTER: ICD-10-CM

## 2023-01-22 ENCOUNTER — EMERGENCY (EMERGENCY)
Facility: HOSPITAL | Age: 73
LOS: 1 days | Discharge: HOME | End: 2023-01-22
Attending: STUDENT IN AN ORGANIZED HEALTH CARE EDUCATION/TRAINING PROGRAM | Admitting: STUDENT IN AN ORGANIZED HEALTH CARE EDUCATION/TRAINING PROGRAM
Payer: MEDICARE

## 2023-01-22 VITALS
TEMPERATURE: 98 F | OXYGEN SATURATION: 97 % | RESPIRATION RATE: 18 BRPM | SYSTOLIC BLOOD PRESSURE: 145 MMHG | DIASTOLIC BLOOD PRESSURE: 83 MMHG | HEART RATE: 87 BPM

## 2023-01-22 VITALS
HEART RATE: 90 BPM | RESPIRATION RATE: 18 BRPM | OXYGEN SATURATION: 97 % | HEIGHT: 73 IN | DIASTOLIC BLOOD PRESSURE: 94 MMHG | TEMPERATURE: 98 F | SYSTOLIC BLOOD PRESSURE: 184 MMHG | WEIGHT: 235.01 LBS

## 2023-01-22 PROBLEM — I71.2 THORACIC AORTIC ANEURYSM, WITHOUT RUPTURE: Chronic | Status: ACTIVE | Noted: 2022-09-25

## 2023-01-22 LAB
ALBUMIN SERPL ELPH-MCNC: 3.9 G/DL — SIGNIFICANT CHANGE UP (ref 3.3–5)
ALP SERPL-CCNC: 65 U/L — SIGNIFICANT CHANGE UP (ref 40–120)
ALT FLD-CCNC: 15 U/L — SIGNIFICANT CHANGE UP (ref 10–45)
ANION GAP SERPL CALC-SCNC: 8 MMOL/L — SIGNIFICANT CHANGE UP (ref 5–17)
AST SERPL-CCNC: 14 U/L — SIGNIFICANT CHANGE UP (ref 10–40)
BASOPHILS # BLD AUTO: 0.06 K/UL — SIGNIFICANT CHANGE UP (ref 0–0.2)
BASOPHILS NFR BLD AUTO: 0.6 % — SIGNIFICANT CHANGE UP (ref 0–2)
BILIRUB SERPL-MCNC: 0.8 MG/DL — SIGNIFICANT CHANGE UP (ref 0.2–1.2)
BUN SERPL-MCNC: 22 MG/DL — SIGNIFICANT CHANGE UP (ref 7–23)
CALCIUM SERPL-MCNC: 9.1 MG/DL — SIGNIFICANT CHANGE UP (ref 8.4–10.5)
CHLORIDE SERPL-SCNC: 100 MMOL/L — SIGNIFICANT CHANGE UP (ref 96–108)
CO2 SERPL-SCNC: 24 MMOL/L — SIGNIFICANT CHANGE UP (ref 22–31)
CREAT SERPL-MCNC: 1 MG/DL — SIGNIFICANT CHANGE UP (ref 0.5–1.3)
CRP SERPL-MCNC: 29.2 MG/L — HIGH (ref 0–4)
EGFR: 80 ML/MIN/1.73M2 — SIGNIFICANT CHANGE UP
EOSINOPHIL # BLD AUTO: 0.33 K/UL — SIGNIFICANT CHANGE UP (ref 0–0.5)
EOSINOPHIL NFR BLD AUTO: 3.4 % — SIGNIFICANT CHANGE UP (ref 0–6)
ERYTHROCYTE [SEDIMENTATION RATE] IN BLOOD: 15 MM/HR — SIGNIFICANT CHANGE UP
GLUCOSE SERPL-MCNC: 153 MG/DL — HIGH (ref 70–99)
HCT VFR BLD CALC: 41 % — SIGNIFICANT CHANGE UP (ref 39–50)
HGB BLD-MCNC: 14 G/DL — SIGNIFICANT CHANGE UP (ref 13–17)
IMM GRANULOCYTES NFR BLD AUTO: 0.4 % — SIGNIFICANT CHANGE UP (ref 0–0.9)
LYMPHOCYTES # BLD AUTO: 1.31 K/UL — SIGNIFICANT CHANGE UP (ref 1–3.3)
LYMPHOCYTES # BLD AUTO: 13.5 % — SIGNIFICANT CHANGE UP (ref 13–44)
MCHC RBC-ENTMCNC: 30.4 PG — SIGNIFICANT CHANGE UP (ref 27–34)
MCHC RBC-ENTMCNC: 34.1 GM/DL — SIGNIFICANT CHANGE UP (ref 32–36)
MCV RBC AUTO: 89.1 FL — SIGNIFICANT CHANGE UP (ref 80–100)
MONOCYTES # BLD AUTO: 1.01 K/UL — HIGH (ref 0–0.9)
MONOCYTES NFR BLD AUTO: 10.4 % — SIGNIFICANT CHANGE UP (ref 2–14)
NEUTROPHILS # BLD AUTO: 6.92 K/UL — SIGNIFICANT CHANGE UP (ref 1.8–7.4)
NEUTROPHILS NFR BLD AUTO: 71.7 % — SIGNIFICANT CHANGE UP (ref 43–77)
NRBC # BLD: 0 /100 WBCS — SIGNIFICANT CHANGE UP (ref 0–0)
PLATELET # BLD AUTO: 222 K/UL — SIGNIFICANT CHANGE UP (ref 150–400)
POTASSIUM SERPL-MCNC: 4.8 MMOL/L — SIGNIFICANT CHANGE UP (ref 3.5–5.3)
POTASSIUM SERPL-SCNC: 4.8 MMOL/L — SIGNIFICANT CHANGE UP (ref 3.5–5.3)
PROT SERPL-MCNC: 7.4 G/DL — SIGNIFICANT CHANGE UP (ref 6–8.3)
RBC # BLD: 4.6 M/UL — SIGNIFICANT CHANGE UP (ref 4.2–5.8)
RBC # FLD: 14 % — SIGNIFICANT CHANGE UP (ref 10.3–14.5)
SODIUM SERPL-SCNC: 132 MMOL/L — LOW (ref 135–145)
WBC # BLD: 9.67 K/UL — SIGNIFICANT CHANGE UP (ref 3.8–10.5)
WBC # FLD AUTO: 9.67 K/UL — SIGNIFICANT CHANGE UP (ref 3.8–10.5)

## 2023-01-22 PROCEDURE — 80053 COMPREHEN METABOLIC PANEL: CPT

## 2023-01-22 PROCEDURE — 73630 X-RAY EXAM OF FOOT: CPT

## 2023-01-22 PROCEDURE — 73630 X-RAY EXAM OF FOOT: CPT | Mod: 26,LT

## 2023-01-22 PROCEDURE — 99283 EMERGENCY DEPT VISIT LOW MDM: CPT

## 2023-01-22 PROCEDURE — 99284 EMERGENCY DEPT VISIT MOD MDM: CPT

## 2023-01-22 PROCEDURE — 85025 COMPLETE CBC W/AUTO DIFF WBC: CPT

## 2023-01-22 PROCEDURE — 85652 RBC SED RATE AUTOMATED: CPT

## 2023-01-22 PROCEDURE — 86140 C-REACTIVE PROTEIN: CPT

## 2023-01-22 PROCEDURE — 36415 COLL VENOUS BLD VENIPUNCTURE: CPT

## 2023-01-22 RX ORDER — CEPHALEXIN 500 MG
1 CAPSULE ORAL
Qty: 21 | Refills: 0
Start: 2023-01-22 | End: 2023-01-28

## 2023-01-22 RX ORDER — CEPHALEXIN 500 MG
1 CAPSULE ORAL
Qty: 28 | Refills: 0
Start: 2023-01-22 | End: 2023-01-28

## 2023-01-22 RX ORDER — CEPHALEXIN 500 MG
500 CAPSULE ORAL ONCE
Refills: 0 | Status: COMPLETED | OUTPATIENT
Start: 2023-01-22 | End: 2023-01-22

## 2023-01-22 RX ORDER — INDOMETHACIN 50 MG
50 CAPSULE ORAL ONCE
Refills: 0 | Status: DISCONTINUED | OUTPATIENT
Start: 2023-01-22 | End: 2023-01-22

## 2023-01-22 RX ORDER — METFORMIN HYDROCHLORIDE 850 MG/1
1 TABLET ORAL
Qty: 0 | Refills: 0 | DISCHARGE

## 2023-01-22 RX ADMIN — Medication 500 MILLIGRAM(S): at 14:00

## 2023-01-22 RX ADMIN — Medication 500 MILLIGRAM(S): at 14:47

## 2023-01-22 NOTE — ED ADULT NURSE NOTE - NSICDXPASTMEDICALHX_GEN_ALL_CORE_FT
PAST MEDICAL HISTORY:  Aortic arch aneurysm     DM (diabetes mellitus)     HTN (hypertension), benign

## 2023-01-22 NOTE — ED PROVIDER NOTE - CLINICAL SUMMARY MEDICAL DECISION MAKING FREE TEXT BOX
71 y/o M hx of HTN, HLD, pre-DM, Gout, thoracic aortic aneurysm presenting with L second and third toe pain and swelling pain for the last five days.   Suspect Gout; less likely septic joint  Given swelling that extends to forefoot with erythema and history of torn cuticle there may be a superimposed cellulitis.   Check screening labs/ESR/CRP, X-ray  Treat with NSAIDs and cephalexin  Will need outpatient podiatry follow up.

## 2023-01-22 NOTE — ED ADULT TRIAGE NOTE - CHIEF COMPLAINT QUOTE
"For at least 5 days 2 toes on my left foot are swollen and now the foot is swollen and red."  used "For at least 5 days 2 toes on my left foot were swollen and now the whole foot is swollen and red."

## 2023-01-22 NOTE — ED ADULT NURSE NOTE - CHIEF COMPLAINT QUOTE
"For at least 5 days 2 toes on my left foot were swollen and now the whole foot is swollen and red."

## 2023-01-22 NOTE — ED PROVIDER NOTE - NSFOLLOWUPCLINICS_GEN_ALL_ED_FT
[facility] - Podiatry Clinic  Podiatry  [address] [place] [address]  Phone: [phone]  Fax:

## 2023-01-22 NOTE — ED ADULT NURSE NOTE - NSFALLRSKOUTCOME_ED_ALL_ED
[With Me] : with me [___ Month(s)] : [unfilled] month(s) [FreeTextEntry1] : Ekg results were reviewed.\par Recent lab results were reviewed.\par \par Fasting CBC, BMP, LFTs, Lipid Profile, HgbA1c, CRP, UA, Urine Microalbumin, Mg, Ca, TSH, T3, T4 prior to next visit\par Echocardiogram - MVR and AI\par Carotid artery duplex - carotid arteriosclerosis\par renew HCTZ\par f/u 6 months\par  Universal Safety Interventions

## 2023-01-22 NOTE — ED PROVIDER NOTE - NSFOLLOWUPINSTRUCTIONS_ED_ALL_ED_FT
1. Please take naproxen 500 mg twice daily until inflammation and pain improve  2. Take cephalexin 500 mg every 6 hours for the next 5-7 days until redness resolves.   3. Follow up with podiatry  4. Return to the emergency department for worsening redness, high fever or any other concerns.

## 2023-01-22 NOTE — ED PROVIDER NOTE - PATIENT PORTAL LINK FT
You can access the FollowMyHealth Patient Portal offered by [facility] by registering at the following website: [email or web address]. By joining [employer]’s FollowMyHealth portal, you will also be able to view your health information using other applications (apps) compatible with our system.

## 2023-01-22 NOTE — ED PROVIDER NOTE - PROGRESS NOTE DETAILS
Patient refusing indomethacin, prednisone and colchicine. Agreeable to naproxen. Will give naproxen and provide podiatry follow up.

## 2023-01-22 NOTE — ED ADULT NURSE NOTE - OBJECTIVE STATEMENT
72 y.o male c/o left toe discomfort. Pt states "I don't know if I hit them or something, but they are red and swollen, it is uncomfortable to walk on at times". Pt denies taking HTN medication this morning, noted hypertensive in triage. Pt denies cp, sob, fever, numbness/tingling, radiating pain, headache.

## 2023-01-22 NOTE — ED PROVIDER NOTE - PHYSICAL EXAMINATION
GENERAL: no acute distress; well-developed  HEAD:  Atraumatic, Normocephalic  EYES: EOMI, PERRLA, conjunctiva and sclera clear  ENT: MMM; oropharynx clear  NECK: Supple, No JVD  CHEST/LUNG: Clear to auscultation bilaterally; No wheeze  HEART: Regular rate and rhythm; No murmurs, rubs, or gallops  ABDOMEN: Soft, Nontender, Nondistended; Bowel sounds present  EXTREMITIES:  2+ Peripheral Pulses, No clubbing, cyanosis, or edema. Second & thrid digits of left foot with erythema and swelling; tophi appear to be present at joints. Mild swelling that extends to forefoot with mild erythema. Patient can flex affect digits actively.   PSYCH: AAOx3  NEUROLOGY: no focal motor or sensory deficits. 5/5 muscle strength in all extremities.   SKIN: No rashes or lesions

## 2023-01-22 NOTE — ED PROVIDER NOTE - OBJECTIVE STATEMENT
71 y/o M hx of HTN, HLD, pre-DM, Gout, thoracic aortic aneurysm presenting with L second and third toe pain and swelling pain for the last five days.     Patient has history of Gout that usually affects right great toe; states pain he is currently feeling feels similar in nature to previous episodes of Gout but was concerned about the extend of swelling. No longer takes allopurinol. No fevers or chills. No discharge. States he may have 'banged' his toes a few days ago but does not recall distinct episode. Also states that he pulled the cuticle of nail of second toe a few days ago. Has not been taking any medication for relief.. 71 y/o M hx of HTN, HLD, pre-DM, Gout, thoracic aortic aneurysm presenting with L second and third toe pain and swelling pain for the last five days.     Patient has history of Gout that usually affects right great toe; states pain he is currently feeling feels similar in nature to previous episodes of Gout but was concerned about the extend of swelling that also includes the forefoot with some associated erythema. No longer takes allopurinol. No fevers or chills. No discharge. States he may have 'banged' his toes a few days ago but does not recall distinct episode. Also states that he pulled the cuticle of nail of second toe a few days ago. Has not been taking any medication for relief..

## 2023-01-23 DIAGNOSIS — E78.5 HYPERLIPIDEMIA, UNSPECIFIED: ICD-10-CM

## 2023-01-23 DIAGNOSIS — E11.9 TYPE 2 DIABETES MELLITUS WITHOUT COMPLICATIONS: ICD-10-CM

## 2023-01-23 DIAGNOSIS — I10 ESSENTIAL (PRIMARY) HYPERTENSION: ICD-10-CM

## 2023-01-23 DIAGNOSIS — M10.9 GOUT, UNSPECIFIED: ICD-10-CM

## 2023-01-23 DIAGNOSIS — Z88.0 ALLERGY STATUS TO PENICILLIN: ICD-10-CM

## 2023-01-23 DIAGNOSIS — Z86.79 PERSONAL HISTORY OF OTHER DISEASES OF THE CIRCULATORY SYSTEM: ICD-10-CM

## 2023-01-23 DIAGNOSIS — Z79.84 LONG TERM (CURRENT) USE OF ORAL HYPOGLYCEMIC DRUGS: ICD-10-CM

## 2023-01-23 DIAGNOSIS — M79.675 PAIN IN LEFT TOE(S): ICD-10-CM

## 2024-08-14 ENCOUNTER — EMERGENCY (EMERGENCY)
Facility: HOSPITAL | Age: 74
LOS: 1 days | Discharge: HOME | End: 2024-08-14
Attending: STUDENT IN AN ORGANIZED HEALTH CARE EDUCATION/TRAINING PROGRAM | Admitting: STUDENT IN AN ORGANIZED HEALTH CARE EDUCATION/TRAINING PROGRAM
Payer: MEDICARE

## 2024-08-14 VITALS
SYSTOLIC BLOOD PRESSURE: 170 MMHG | HEIGHT: 73 IN | TEMPERATURE: 98 F | RESPIRATION RATE: 18 BRPM | OXYGEN SATURATION: 97 % | DIASTOLIC BLOOD PRESSURE: 93 MMHG | WEIGHT: 229.94 LBS | HEART RATE: 88 BPM
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
## 2024-08-14 DIAGNOSIS — S20.211A CONTUSION OF RIGHT FRONT WALL OF THORAX, INITIAL ENCOUNTER: ICD-10-CM

## 2024-08-14 DIAGNOSIS — Z88.0 ALLERGY STATUS TO PENICILLIN: ICD-10-CM
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
## 2024-08-14 DIAGNOSIS — R07.81 PLEURODYNIA: ICD-10-CM

## 2024-08-14 PROCEDURE — 99284 EMERGENCY DEPT VISIT MOD MDM: CPT

## 2024-08-14 PROCEDURE — 99284 EMERGENCY DEPT VISIT MOD MDM: CPT | Mod: 25

## 2024-08-14 PROCEDURE — 71250 CT THORAX DX C-: CPT | Mod: MC

## 2024-08-14 PROCEDURE — 71250 CT THORAX DX C-: CPT | Mod: 26,MC

## 2024-08-14 RX ORDER — IBUPROFEN 200 MG
600 TABLET ORAL ONCE
Refills: 0 | Status: COMPLETED | OUTPATIENT
Start: 2024-08-14 | End: 2024-08-14

## 2024-08-14 RX ORDER — ACETAMINOPHEN 500 MG/5ML
1000 LIQUID (ML) ORAL ONCE
Refills: 0 | Status: COMPLETED | OUTPATIENT
Start: 2024-08-14 | End: 2024-08-14

## 2024-08-14 RX ADMIN — Medication 600 MILLIGRAM(S): at 20:47

## 2024-08-14 RX ADMIN — Medication 1000 MILLIGRAM(S): at 20:47

## 2024-08-15 VITALS
OXYGEN SATURATION: 97 % | HEART RATE: 72 BPM | DIASTOLIC BLOOD PRESSURE: 82 MMHG | TEMPERATURE: 98 F | SYSTOLIC BLOOD PRESSURE: 144 MMHG | RESPIRATION RATE: 16 BRPM

## 2025-01-31 NOTE — ED PROVIDER NOTE - WR INTERPRETATION 1
Continued Stay SW/CM Assessment/Plan of Care Note       Active Substitute Decision Maker (SDM)    There are no active Substitute Decision Maker (SDM) on file.           Progress note:  Chart reviewed and patient discussed with interdisciplinary team. Patient admitted for ureterolithiasis presented with septic shock and left ureterolithiasis with UTI. Pt was hypotensive and required pressures. Urology consulted and took patient for cystoscopy with ureteral stent placement, pt improved considerably and was able to be weaned off pressors. Patient transferred out of ICU today. CM will continue to follow. Plan is for Out Patient PT and OT, orders placed.    See SW/CM flowsheets for other objective data.    Disposition Recommendations:  Preliminary discharge destination: Planned Discharge Destination: Home with services/support  SW/CM recommendation for discharge: Home, Home care, OP therapy    Destination Pharmacy:        Discharge Plan/Needs:     Continued Care and Services - Admitted Since 1/28/2025    No active coordination exists for this encounter.       Prior To Hospitalization:    Living Situation: Family members and residing at House    .  Support Systems: Siblings, Family members, Friends   Home Devices/Equipment: None            Mobility Assist Devices: None   Type of Service Prior to Hospitalization: None               Patient/Family discharge goal (s):  Home     Resources provided:           Prior Function                Current Function  Last Filed Values         Value Time User    Current Function  slightly below baseline level of function 1/30/2025  3:30 PM Arethamin, Meena, PT    Therapy Impairments  activity tolerance; balance; cognition 1/30/2025  3:30 PM Demmin, Meena, PT    ADLs Requiring Support  transfers; ambulation; stairs 1/30/2025  3:30 PM Demmin, Meena, PT            Therapy Recommendations for Discharge:   PT:      Last Filed Values         Value Time User    PT Discharge Needs  therapy 1-3  times per week 1/30/2025  3:30 PM Meena Carroll, PT          OT:       Last Filed Values         Value Time User    OT Discharge Needs  therapy 1-3 times per week 1/30/2025  3:20 PM Brittnee Banks OT          SLP:    Last Filed Values       None            Mobility Equipment Recommended for Discharge: Has access to 2WW      Barriers to Discharge  Identified Barriers to Discharge/Transition Planning: Medical necessity for acute care                   no acute fracture or bone erosion noted
